# Patient Record
Sex: MALE | Race: OTHER
[De-identification: names, ages, dates, MRNs, and addresses within clinical notes are randomized per-mention and may not be internally consistent; named-entity substitution may affect disease eponyms.]

---

## 2018-12-03 ENCOUNTER — HOSPITAL ENCOUNTER (INPATIENT)
Dept: HOSPITAL 72 - EMR | Age: 68
LOS: 4 days | Discharge: NURSING, REHAB OR LONG-TERM CARE | DRG: 871 | End: 2018-12-07
Payer: MEDICARE

## 2018-12-03 VITALS — BODY MASS INDEX: 24.27 KG/M2 | HEIGHT: 66 IN | WEIGHT: 151 LBS

## 2018-12-03 VITALS — DIASTOLIC BLOOD PRESSURE: 71 MMHG | SYSTOLIC BLOOD PRESSURE: 122 MMHG

## 2018-12-03 VITALS — DIASTOLIC BLOOD PRESSURE: 83 MMHG | SYSTOLIC BLOOD PRESSURE: 121 MMHG

## 2018-12-03 VITALS — SYSTOLIC BLOOD PRESSURE: 139 MMHG | DIASTOLIC BLOOD PRESSURE: 72 MMHG

## 2018-12-03 VITALS — SYSTOLIC BLOOD PRESSURE: 136 MMHG | DIASTOLIC BLOOD PRESSURE: 85 MMHG

## 2018-12-03 DIAGNOSIS — G92: ICD-10-CM

## 2018-12-03 DIAGNOSIS — A41.9: Primary | ICD-10-CM

## 2018-12-03 DIAGNOSIS — D63.8: ICD-10-CM

## 2018-12-03 DIAGNOSIS — K44.9: ICD-10-CM

## 2018-12-03 DIAGNOSIS — I10: ICD-10-CM

## 2018-12-03 DIAGNOSIS — K27.9: ICD-10-CM

## 2018-12-03 DIAGNOSIS — Z86.73: ICD-10-CM

## 2018-12-03 DIAGNOSIS — K80.20: ICD-10-CM

## 2018-12-03 DIAGNOSIS — H26.9: ICD-10-CM

## 2018-12-03 DIAGNOSIS — G30.9: ICD-10-CM

## 2018-12-03 DIAGNOSIS — E87.1: ICD-10-CM

## 2018-12-03 DIAGNOSIS — N40.0: ICD-10-CM

## 2018-12-03 DIAGNOSIS — Z79.4: ICD-10-CM

## 2018-12-03 DIAGNOSIS — K21.9: ICD-10-CM

## 2018-12-03 DIAGNOSIS — F02.80: ICD-10-CM

## 2018-12-03 LAB
ADD MANUAL DIFF: NO
ALBUMIN SERPL-MCNC: 3.6 G/DL (ref 3.4–5)
ALBUMIN/GLOB SERPL: 0.8 {RATIO} (ref 1–2.7)
ALP SERPL-CCNC: 181 U/L (ref 46–116)
ALT SERPL-CCNC: 37 U/L (ref 12–78)
ANION GAP SERPL CALC-SCNC: 10 MMOL/L (ref 5–15)
APPEARANCE UR: CLEAR
APTT PPP: (no result) S
AST SERPL-CCNC: 18 U/L (ref 15–37)
BASOPHILS NFR BLD AUTO: 0.4 % (ref 0–2)
BILIRUB SERPL-MCNC: 0.4 MG/DL (ref 0.2–1)
BUN SERPL-MCNC: 9 MG/DL (ref 7–18)
CALCIUM SERPL-MCNC: 9.1 MG/DL (ref 8.5–10.1)
CHLORIDE SERPL-SCNC: 99 MMOL/L (ref 98–107)
CO2 SERPL-SCNC: 26 MMOL/L (ref 21–32)
CREAT SERPL-MCNC: 0.9 MG/DL (ref 0.55–1.3)
EOSINOPHIL NFR BLD AUTO: 0.3 % (ref 0–3)
ERYTHROCYTE [DISTWIDTH] IN BLOOD BY AUTOMATED COUNT: 12.2 % (ref 11.6–14.8)
GLOBULIN SER-MCNC: 4.8 G/DL
GLUCOSE UR STRIP-MCNC: (no result) MG/DL
HCT VFR BLD CALC: 41.8 % (ref 42–52)
HGB BLD-MCNC: 14 G/DL (ref 14.2–18)
KETONES UR QL STRIP: (no result)
LEUKOCYTE ESTERASE UR QL STRIP: NEGATIVE
LYMPHOCYTES NFR BLD AUTO: 11.4 % (ref 20–45)
MCV RBC AUTO: 74 FL (ref 80–99)
MONOCYTES NFR BLD AUTO: 4.3 % (ref 1–10)
NEUTROPHILS NFR BLD AUTO: 83.7 % (ref 45–75)
NITRITE UR QL STRIP: NEGATIVE
PH UR STRIP: 6.5 [PH] (ref 4.5–8)
PLATELET # BLD: 247 K/UL (ref 150–450)
POTASSIUM SERPL-SCNC: 4.1 MMOL/L (ref 3.5–5.1)
PROT UR QL STRIP: NEGATIVE
RBC # BLD AUTO: 5.61 M/UL (ref 4.7–6.1)
SODIUM SERPL-SCNC: 135 MMOL/L (ref 136–145)
SP GR UR STRIP: 1 (ref 1–1.03)
UROBILINOGEN UR-MCNC: NORMAL MG/DL (ref 0–1)
WBC # BLD AUTO: 12.6 K/UL (ref 4.8–10.8)

## 2018-12-03 PROCEDURE — 86140 C-REACTIVE PROTEIN: CPT

## 2018-12-03 PROCEDURE — 82009 KETONE BODYS QUAL: CPT

## 2018-12-03 PROCEDURE — 82105 ALPHA-FETOPROTEIN SERUM: CPT

## 2018-12-03 PROCEDURE — 36415 COLL VENOUS BLD VENIPUNCTURE: CPT

## 2018-12-03 PROCEDURE — 84100 ASSAY OF PHOSPHORUS: CPT

## 2018-12-03 PROCEDURE — 80048 BASIC METABOLIC PNL TOTAL CA: CPT

## 2018-12-03 PROCEDURE — 80053 COMPREHEN METABOLIC PANEL: CPT

## 2018-12-03 PROCEDURE — 84443 ASSAY THYROID STIM HORMONE: CPT

## 2018-12-03 PROCEDURE — 71045 X-RAY EXAM CHEST 1 VIEW: CPT

## 2018-12-03 PROCEDURE — 85025 COMPLETE CBC W/AUTO DIFF WBC: CPT

## 2018-12-03 PROCEDURE — 85060 BLOOD SMEAR INTERPRETATION: CPT

## 2018-12-03 PROCEDURE — 83690 ASSAY OF LIPASE: CPT

## 2018-12-03 PROCEDURE — 82962 GLUCOSE BLOOD TEST: CPT

## 2018-12-03 PROCEDURE — 83036 HEMOGLOBIN GLYCOSYLATED A1C: CPT

## 2018-12-03 PROCEDURE — 99284 EMERGENCY DEPT VISIT MOD MDM: CPT

## 2018-12-03 PROCEDURE — 96360 HYDRATION IV INFUSION INIT: CPT

## 2018-12-03 PROCEDURE — 87081 CULTURE SCREEN ONLY: CPT

## 2018-12-03 PROCEDURE — 74177 CT ABD & PELVIS W/CONTRAST: CPT

## 2018-12-03 PROCEDURE — 85007 BL SMEAR W/DIFF WBC COUNT: CPT

## 2018-12-03 PROCEDURE — 84550 ASSAY OF BLOOD/URIC ACID: CPT

## 2018-12-03 PROCEDURE — 74182 MRI ABDOMEN W/CONTRAST: CPT

## 2018-12-03 PROCEDURE — 82607 VITAMIN B-12: CPT

## 2018-12-03 PROCEDURE — 80076 HEPATIC FUNCTION PANEL: CPT

## 2018-12-03 PROCEDURE — 87040 BLOOD CULTURE FOR BACTERIA: CPT

## 2018-12-03 PROCEDURE — 83735 ASSAY OF MAGNESIUM: CPT

## 2018-12-03 PROCEDURE — 82248 BILIRUBIN DIRECT: CPT

## 2018-12-03 PROCEDURE — 81003 URINALYSIS AUTO W/O SCOPE: CPT

## 2018-12-03 PROCEDURE — 80061 LIPID PANEL: CPT

## 2018-12-03 RX ADMIN — INSULIN ASPART SCH UNITS: 100 INJECTION, SOLUTION INTRAVENOUS; SUBCUTANEOUS at 20:49

## 2018-12-03 RX ADMIN — DONEPEZIL HYDROCHLORIDE SCH MG: 10 TABLET, FILM COATED ORAL at 20:48

## 2018-12-03 RX ADMIN — DOCUSATE SODIUM SCH MG: 100 CAPSULE, LIQUID FILLED ORAL at 20:48

## 2018-12-03 NOTE — DIAGNOSTIC IMAGING REPORT
Clinical Indication: Abdominal pain, right lower quadrant

 

Technique:   No oral contrast utilized, per emergency room physician request  IV

administration nonionic contrast. Venous phase spiral acquisition obtained through

the abdomen and pelvis. Multiplanar reconstructions were generated. Total dose length

product 911.44 mGycm. CTDIvol(s) 16.14 mGy. Dose reduction achieved using automated

exposure control

 

 

Comparison: No comparison CTs. Reference made to abdomen MRI dated 9/12/2016 and

abdominal ultrasound dated 9/9/2016

 

Findings: Lack of enteric contrast limits assessment of the GI tract. The appendix is

normal. No evidence of diverticulosis or diverticulitis. There are bilateral inguinal

hernias that contain only fat. No small bowel distention. No free or loculated

intraperitoneal gas or fluid is evident. The distal esophagus, stomach, duodenum are

unremarkable.

 

The gallbladder contains gallstones. It is not distended and there is no gallbladder

wall thickening. The liver is mildly hypoattenuating. No focal abnormality

demonstrated. The extra hepatic bile ducts are mildly ectatic, measuring up to 7 mm

diameter. No downstream obstructive lesion demonstrated. The pancreas, spleen,

adrenals, right kidney are unremarkable. The with kidneys demonstrate subcentimeter

low-attenuation lesions which are too small to characterize. No renal or ureteral

calculi, hydronephrosis, or hydroureter demonstrated. The bladder is unremarkable.

The prostate is somewhat prominent, contains calcifications. No pelvic mass or

adenopathy. No retroperitoneal or mesenteric mass or adenopathy. There is a

retroaortic left renal vein incidentally noted.

 

The included lung bases are clear. The bones demonstrate degenerative spondylosis

changes.

 

Impression: Limited assessment of the GI tract, due to lack of enteric contrast

administration

 

No gross acute abnormality

 

Cholelithiasis, also previously described

 

Mildly fatty liver

 

Note that prior MRI described an area of signal abnormality, suspected mass, in

segment 4A of the liver. This is not evident on the current CT so significance of

this previous finding is uncertain

 

Mild prostatomegaly

 

Bilateral subcentimeter low-attenuation renal lesions, too small to characterize,

most likely benign simple cortical cysts

 

Incidental findings as noted, including degenerative spondylosis, retroaortic left

renal vein, bilateral fat-containing inguinal hernias, prostatic calcification

 

The CT scanner at [facility] is accredited by the [employer] College of

Radiology and the scans are performed using protocols designed to limit radiation

exposure to as low as reasonably achievable to attain images of sufficient resolution

adequate for diagnostic evaluation.

## 2018-12-03 NOTE — EMERGENCY ROOM REPORT
History of Present Illness


General


Chief Complaint:  Abnormal Labs


Source:  Patient


 (Tyler Dalton MD)





Present Illness


HPI


68-year-old male presents ED for evaluation.  Brought in by EMS for elevated 

blood sugar.  Coming from skilled nursing facility.  Accu-Chek greater than 

300.  Upon arrival patient showing no signs of distress.  Nonverbal at 

baseline.  Patient told nursing staff he's been having abdominal pain as well.  

Patient points to right lower quadrant.  Nonradiating.  Unable to provide any 

additional history at this time.  No nausea or vomiting.  No fevers or chills.  

No chest pain shortness of breath.  No other aggravating relieving factors.  

Denies any other associated symptoms


 (Tyler Dalton MD)


Allergies:  


Coded Allergies:  


     No Known Allergies (Unverified , 9/5/16)





Patient History


Past Medical History:  DM, HTN, CVA/TIA


Past Surgical History:  none


Pertinent Family History:  none


Social History:  Denies: smoking, alcohol use, drug use


Immunizations:  UTD


Reviewed Nursing Documentation:  PMH: Agreed; PSxH: Agreed (Tyler Dalton MD)





Nursing Documentation-PMH


Past Medical History:  No History, Except For


Hx Cardiac Problems:  Yes


Hx Hypertension:  Yes


Hx Diabetes:  Yes


Hx Cancer:  No


Hx Gastrointestinal Problems:  No


Hx Neurological Problems:  Yes


Hx Cerebrovascular Accident:  Yes


Hx Transient Ischemic Attacks:  No


Hx Dementia:  No


Hx Alzheimer's Disease:  No


Hx Parkinson's Disease:  No


Hx Meningitis:  No


Hx Encephalitis:  No


Hx Seizures:  No


Hx Epilepsy:  No


Hx Multiple Sclerosis:  No


Hx Cerebral Palsy:  No


Hx Amyotrophic Lat Sclerosis:  No


Hx Weakness:  Yes


Hx Neurologic Surgery:  No


Hx Brain Shunt:  No


 (Tyler Dalton MD)





Review of Systems


All Other Systems:  limited


 (Tyler Dalton MD)





Physical Exam





Vital Signs








  Date Time  Temp Pulse Resp B/P (MAP) Pulse Ox O2 Delivery O2 Flow Rate FiO2


 


12/3/18 13:06 98.4 112 20 138/85 94   








Sp02 EP Interpretation:  reviewed, normal


General Appearance:  no apparent distress, other - nonverbal


Head:  normocephalic, atraumatic


Eyes:  bilateral eye normal inspection, bilateral eye PERRL


ENT:  hearing grossly normal, normal pharynx, no angioedema, normal voice


Neck:  full range of motion, supple/symm/no masses


Respiratory:  chest non-tender, lungs clear, normal breath sounds, speaking 

full sentences


Cardiovascular #1:  regular rate, rhythm, no edema


Cardiovascular #2:  2+ carotid (R), 2+ carotid (L), 2+ radial (R), 2+ radial (L)

, 2+ dorsalis pedis (R), 2+ dorsalis pedis (L)


Gastrointestinal:  normal bowel sounds, soft, non-distended, no guarding, no 

rebound, tenderness - RLQ


Rectal:  deferred


Genitourinary:  normal inspection, no CVA tenderness


Musculoskeletal:  back normal, gait/station normal, normal range of motion, non-

tender


Neurologic:  other - nonverbal


Psychiatric:  other - nonverbal


Reflexes:  3+ bicep (R), 3+ bicep (L), 3+ tricep (R), 3+ tricep (L), 3+ knee (R)

, 3+ knee (L)


Skin:  normal color, no rash, warm/dry, well hydrated


Lymphatic:  no adenopathy


 (Tyler Dalton MD)





Medical Decision Making


Diagnostic Impression:  


 Primary Impression:  


 Diabetes mellitus out of control





Labs








Test


  12/3/18


13:31


 


White Blood Count


  12.6 K/UL


(4.8-10.8)


 


Red Blood Count


  5.61 M/UL


(4.70-6.10)


 


Hemoglobin


  14.0 G/DL


(14.2-18.0)


 


Hematocrit


  41.8 %


(42.0-52.0)


 


Mean Corpuscular Volume 74 FL (80-99) 


 


Mean Corpuscular Hemoglobin


  25.0 PG


(27.0-31.0)


 


Mean Corpuscular Hemoglobin


Concent 33.6 G/DL


(32.0-36.0)


 


Red Cell Distribution Width


  12.2 %


(11.6-14.8)


 


Platelet Count


  247 K/UL


(150-450)


 


Mean Platelet Volume


  6.7 FL


(6.5-10.1)


 


Neutrophils (%) (Auto)


  83.7 %


(45.0-75.0)


 


Lymphocytes (%) (Auto)


  11.4 %


(20.0-45.0)


 


Monocytes (%) (Auto)


  4.3 %


(1.0-10.0)


 


Eosinophils (%) (Auto)


  0.3 %


(0.0-3.0)


 


Basophils (%) (Auto)


  0.4 %


(0.0-2.0)


 


Sodium Level


  135 MMOL/L


(136-145)


 


Potassium Level


  4.1 MMOL/L


(3.5-5.1)


 


Chloride Level


  99 MMOL/L


()


 


Carbon Dioxide Level


  26 MMOL/L


(21-32)


 


Anion Gap


  10 mmol/L


(5-15)


 


Blood Urea Nitrogen 9 mg/dL (7-18) 


 


Creatinine


  0.9 MG/DL


(0.55-1.30)


 


Estimat Glomerular Filtration


Rate > 60 mL/min


(>60)


 


Glucose Level


  360 MG/DL


()


 


Calcium Level


  9.1 MG/DL


(8.5-10.1)


 


Magnesium Level


  1.3 MG/DL


(1.8-2.4)


 


Total Bilirubin


  0.4 MG/DL


(0.2-1.0)


 


Aspartate Amino Transf


(AST/SGOT) 18 U/L (15-37) 


 


 


Alanine Aminotransferase


(ALT/SGPT) 37 U/L (12-78) 


 


 


Alkaline Phosphatase


  181 U/L


()


 


Total Protein


  8.4 G/DL


(6.4-8.2)


 


Albumin


  3.6 G/DL


(3.4-5.0)


 


Globulin 4.8 g/dL 


 


Albumin/Globulin Ratio 0.8 (1.0-2.7) 


 


Lipase


  244 U/L


()


 


Acetone Level


  Negative


(NEGATIVE)








 (Tyler Dalton MD)





CT/MRI/US Diagnostic Results


CT/MRI/US Diagnostic Results :  


   Imaging Test Ordered:  CT abd/pelvis


   Impression


Limited assessment of the GI tract.  No gross acute abnormality.  

Cholelithiasis.  See official report for other incidental findings.


 ([facility]. )





Last Vital Signs








  Date Time  Temp Pulse Resp B/P (MAP) Pulse Ox O2 Delivery O2 Flow Rate FiO2


 


12/3/18 13:06 98.4 112 20 138/85 94   








 (Tyler Dalton MD)


Referrals:  


Sancho Ibarra MD (PCP)











Tyler Dalton MD Dec 3, 2018 14:55


[facility].  Dec 3, 2018 16:54

## 2018-12-04 VITALS — SYSTOLIC BLOOD PRESSURE: 123 MMHG | DIASTOLIC BLOOD PRESSURE: 87 MMHG

## 2018-12-04 VITALS — SYSTOLIC BLOOD PRESSURE: 161 MMHG | DIASTOLIC BLOOD PRESSURE: 95 MMHG

## 2018-12-04 VITALS — DIASTOLIC BLOOD PRESSURE: 93 MMHG | SYSTOLIC BLOOD PRESSURE: 126 MMHG

## 2018-12-04 VITALS — SYSTOLIC BLOOD PRESSURE: 94 MMHG | DIASTOLIC BLOOD PRESSURE: 72 MMHG

## 2018-12-04 VITALS — SYSTOLIC BLOOD PRESSURE: 153 MMHG | DIASTOLIC BLOOD PRESSURE: 99 MMHG

## 2018-12-04 VITALS — SYSTOLIC BLOOD PRESSURE: 153 MMHG | DIASTOLIC BLOOD PRESSURE: 91 MMHG

## 2018-12-04 LAB
ADD MANUAL DIFF: YES
ALBUMIN SERPL-MCNC: 3.4 G/DL (ref 3.4–5)
ALBUMIN/GLOB SERPL: 0.8 {RATIO} (ref 1–2.7)
ALP SERPL-CCNC: 136 U/L (ref 46–116)
ALT SERPL-CCNC: 31 U/L (ref 12–78)
ANION GAP SERPL CALC-SCNC: 10 MMOL/L (ref 5–15)
AST SERPL-CCNC: 17 U/L (ref 15–37)
BILIRUB DIRECT SERPL-MCNC: 0.2 MG/DL (ref 0–0.3)
BILIRUB SERPL-MCNC: 1.1 MG/DL (ref 0.2–1)
BUN SERPL-MCNC: 6 MG/DL (ref 7–18)
CALCIUM SERPL-MCNC: 8.5 MG/DL (ref 8.5–10.1)
CHLORIDE SERPL-SCNC: 98 MMOL/L (ref 98–107)
CHOLEST SERPL-MCNC: 80 MG/DL (ref ?–200)
CO2 SERPL-SCNC: 25 MMOL/L (ref 21–32)
CREAT SERPL-MCNC: 0.6 MG/DL (ref 0.55–1.3)
ERYTHROCYTE [DISTWIDTH] IN BLOOD BY AUTOMATED COUNT: 11.9 % (ref 11.6–14.8)
GLOBULIN SER-MCNC: 4.5 G/DL
HCT VFR BLD CALC: 38.8 % (ref 42–52)
HDLC SERPL-MCNC: 37 MG/DL (ref 40–60)
HGB BLD-MCNC: 13 G/DL (ref 14.2–18)
MCV RBC AUTO: 74 FL (ref 80–99)
PHOSPHATE SERPL-MCNC: 3.2 MG/DL (ref 2.5–4.9)
PLATELET # BLD: 240 K/UL (ref 150–450)
POTASSIUM SERPL-SCNC: 3.6 MMOL/L (ref 3.5–5.1)
RBC # BLD AUTO: 5.28 M/UL (ref 4.7–6.1)
SODIUM SERPL-SCNC: 133 MMOL/L (ref 136–145)
TRIGL SERPL-MCNC: 58 MG/DL (ref 30–150)
WBC # BLD AUTO: 16.2 K/UL (ref 4.8–10.8)

## 2018-12-04 RX ADMIN — SODIUM CHLORIDE SCH MLS/HR: 0.9 INJECTION INTRAVENOUS at 16:40

## 2018-12-04 RX ADMIN — INSULIN ASPART SCH UNITS: 100 INJECTION, SOLUTION INTRAVENOUS; SUBCUTANEOUS at 21:20

## 2018-12-04 RX ADMIN — Medication SCH MG: at 09:09

## 2018-12-04 RX ADMIN — DONEPEZIL HYDROCHLORIDE SCH MG: 10 TABLET, FILM COATED ORAL at 21:17

## 2018-12-04 RX ADMIN — INSULIN ASPART SCH UNITS: 100 INJECTION, SOLUTION INTRAVENOUS; SUBCUTANEOUS at 05:56

## 2018-12-04 RX ADMIN — INSULIN ASPART SCH UNITS: 100 INJECTION, SOLUTION INTRAVENOUS; SUBCUTANEOUS at 16:42

## 2018-12-04 RX ADMIN — HEPARIN SODIUM SCH UNITS: 5000 INJECTION INTRAVENOUS; SUBCUTANEOUS at 21:19

## 2018-12-04 RX ADMIN — METOPROLOL TARTRATE SCH MG: 25 TABLET, FILM COATED ORAL at 21:17

## 2018-12-04 RX ADMIN — Medication SCH EA: at 09:09

## 2018-12-04 RX ADMIN — INSULIN ASPART SCH UNITS: 100 INJECTION, SOLUTION INTRAVENOUS; SUBCUTANEOUS at 12:42

## 2018-12-04 RX ADMIN — HEPARIN SODIUM SCH UNITS: 5000 INJECTION INTRAVENOUS; SUBCUTANEOUS at 09:11

## 2018-12-04 RX ADMIN — ASPIRIN SCH MG: 81 TABLET, DELAYED RELEASE ORAL at 09:09

## 2018-12-04 RX ADMIN — TAMSULOSIN HYDROCHLORIDE SCH MG: 0.4 CAPSULE ORAL at 21:18

## 2018-12-04 RX ADMIN — INSULIN ASPART SCH UNITS: 100 INJECTION, SOLUTION INTRAVENOUS; SUBCUTANEOUS at 11:50

## 2018-12-04 RX ADMIN — INSULIN ASPART SCH UNITS: 100 INJECTION, SOLUTION INTRAVENOUS; SUBCUTANEOUS at 16:43

## 2018-12-04 RX ADMIN — DOCUSATE SODIUM SCH MG: 100 CAPSULE, LIQUID FILLED ORAL at 09:09

## 2018-12-04 NOTE — CONSULTATION
DATE OF CONSULTATION:  12/04/2018

INFECTIOUS DISEASE CONSULT



CONSULTING PHYSICIAN:  Florin Bowles M.D.



PRIMARY ATTENDING PHYSICIAN:  Sancho Ibarra M.D.



REASON FOR CONSULT:  Leukocytosis.



HISTORY OF PRESENT ILLNESS:  This 68-year-old  male admitted

yesterday from a skilled nursing facility because of uncontrolled diabetes

mellitus.  He had a blood sugar of 316 in hospital.  At presentation had 
leukocytosis of

12.6 that is increased to 16.2.  According to ER doctor, he complained of

abdominal pain and had a CT scan of abdomen and pelvis.  The patient has

dementia, and is a poor historian.



PAST MEDICAL HISTORY:  Significant for diabetes mellitus, hypertension, has

history of GI bleeding in 2016, has Alzheimer dementia, has history of

peptic ulcer, has history of BPH, and cataract.



ALLERGIES:  No known drug allergies.



MEDICATIONS:   metformin, Colace, lisinopril, atorvastatin, Flomax,

metoprolol, haloperidol, insulin, lisinopril, Lopressor, aspirin,

Cymbalta, multivitamin, Protonix, Senokot, heparin, insulin Detemir,

Aricept, milk of magnesia, and bisacodyl.



SOCIAL HISTORY:  Nursing home resident.  .  No other history

obtainable by the patient.



PHYSICAL EXAMINATION:

VITAL SIGNS:  Temperature 98.7, pulse is 78, and blood pressure is 152/91.

At the time of admission, pulse was 112.

GENERAL APPEARANCE:  No acute distress.

HEAD AND NECK:  Mucous membranes are moist.

HEART:  S1 and S2.  Regular.

LUNGS:  Clear.

ABDOMEN:  Soft, obese, and nontender.

EXTREMITIES:  Has no edema.



LABORATORY AND DIAGNOSTIC DATA:  WBC 16.2, hemoglobin 13, hematocrit 38.8,

and platelets 240,000.  Sodium 133, potassium 3.6, chloride 98, BUN 6,

creatinine 0.6, glucose 222.  LFTs within normal limits.  UA shows

negative for nitrites.  Blood was positive for ketones.  Blood acetone

level was negative.  CT scan of the abdomen and pelvis showed

cholelithiasis, calcified prostate, and mild fatty liver.



IMPRESSION:  Sepsis or systemic inflammatory response syndrome.  The

patient had tachycardia and leukocytosis.  Source of infection is not

clear, but may have early pneumonia.  The patient has diabetes mellitus

with hyperglycemia, has Alzheimer dementia, hypertension, benign prostatic

hypertrophy, and cataract.



RECOMMENDATION:

1. We will ask for chest x-ray.

2. We will start empirically on Rocephin and follow up the cultures.



At the end of my exam, I thank Dr. Ibarra, for involving me in the care

of this patient.









  ______________________________________________

  Florin Bowles M.D.





DR:  GERRI

D:  12/04/2018 12:21

T:  12/04/2018 19:23

JOB#:  [id number]

CC:



YANNA

## 2018-12-04 NOTE — DIAGNOSTIC IMAGING REPORT
Indication: Chest pain

 

Technique: One view of the chest

 

Comparison: 9/5/2016

 

Findings: Previously demonstrated right basilar atelectasis is no longer evident.

Inspiration is suboptimal. Lungs pleural spaces are clear. The heart size is upper

limits of normal. The aorta is tortuous and calcified.

 

Impression: No acute process

## 2018-12-04 NOTE — HISTORY AND PHYSICAL REPORT
DATE OF ADMISSION:  12/03/2018

HISTORY OF PRESENT ILLNESS:  The patient was admitted for hyperglycemia,

uncontrolled diabetes.  The patient is labile diabetic and admitted for

that reason.  The patient is a poor historian cannot get reliable history

from the patient.  The patient is also complaining of abdominal pain.  No

nausea, no vomiting, no diarrhea, no constipation.  She is admitted for

those reasons.



PAST MEDICAL HISTORY:  Organic brain syndrome, NIDDM, hypertension, history

of CVA, history of hypertension and diabetes, hyperlipidemia, constipation

as well as GERD, psychosis and BPH.



MEDICATIONS:  Aspirin, Lipitor, insulin, benazepril, Colace, Cymbalta,

ferrous sulfate, Levemir, multivitamin, metformin, Protonix, Seroquel, and

Flomax.



FAMILY HISTORY:  Unable to obtain.



SOCIAL HISTORY:  No history of alcohol or illicit drugs.  Comes from a

nursing home.



REVIEW OF SYSTEMS:  HEENT:  Denies headaches.    RESPIRATORY:  Denies

shortness of breath.  Denies cough.    CARDIOVASCULAR:  Denies chest pain.

GASTROINTESTINAL:  Denies nausea, vomiting, or diarrhea.  Does complain

of abdominal pain.  The patient is relatively a poor historian.



PHYSICAL EXAMINATION:

VITAL SIGNS:  Temperature 96.8, pulse is 111, blood pressure 122/71.

HEENT:  PERRLA.

NECK:  Supple.  No lymphadenopathy.

CHEST:  Clear to auscultation

GASTROINTESTINAL:  Diffuse tenderness, however, abdomen is soft and not in

any acute distress.  Reflexes equal on both sides.

SKIN:  For skin integrity, please refer to the nursing notes.

NEUROLOGIC:  The patient has generalized weakness, which is

chronic.



LABORATORY DATA:  WBC of 12.6, hemoglobin of 14, and platelets of 247.

Sodium 135, potassium 4.1, BUN of 9, and creatinine 0.9, and glucose of

360.



ASSESSMENT AND PLAN:  Hyperglycemia, uncontrolled diabetes, and abdominal

pain.



I have asked Dr. Nikolas Alvarado, Dr. Chu, Dr. Florin Bowles, Dr. Anne to see the patient to rule out any infectious etiology as well as

for workup abdominal pain and also for basically fluid management, the

patient will not be on dry side.









  ______________________________________________

  Sancho Ibarra M.D. DR:  Kyle

D:  12/04/2018 10:53

T:  12/04/2018 19:36

JOB#:  [id number]

CC:

## 2018-12-04 NOTE — CONSULTATION
History of Present Illness


General


Date patient seen:  Dec 4, 2018


Chief Complaint:  Abnormal Labs


Referring physician:  DIGNA BRISCOE


Reason for Consultation:  ABDOMINAL PAIN





Present Illness


HPI


68-year-old male with hx of CVA and anxiety, who presented to the emergency 

department for evaluation brought by the EMS


for elevated glucose, he is coming from the skilled nursing facility. the pt pw 

worsening of confusion and agitation


Allergies:  


Coded Allergies:  


     No Known Allergies (Unverified , 9/5/16)





Medication History


Scheduled


Ascorbic Acid* (Vitamin C*), 500 MG ORAL DAILY, (Reported)


Aspirin (Aspirin EC), 81 MG ORAL DAILY, (Reported)


Atorvastatin Calcium* (Atorvastatin Calcium*), 80 MG ORAL DAILY, (Reported)


Docusate Sodium* (Docusate Sodium*), 100 MG ORAL Q12HR, (Reported)


Donepezil Hcl* (Donepezil Hcl*), 10 MG ORAL QHS, (Reported)


Duloxetine (Cymbalta), 80 MG ORAL DAILY, (Reported)


Ferrous Sulfate* (Ferrous Sulfate*), 325 MG ORAL DAILY, (Reported)


Insulin Detemir (Levemir), 14 UNITS SUBQ DAILY, (Reported)


Insulin Detemir (Levemir), 30 UNITS SUBQ BEDTIME, (Reported)


Metformin Hcl* (Metformin Hcl*), 850 MG ORAL BID, (Reported)


Multivitamin With Minerals (Multivitamins With Minerals*), 1 TAB ORAL DAILY, (

Reported)


Nifedipine (Nifedipine*), 60 MG ORAL EVERY 8 HOURS, (Reported)


Pantoprazole* (Protonix*), 40 MG ORAL DAILY, (Reported)


Quinapril Hcl (Quinapril Hcl), 20 MG PO BID, (Reported)


Tamsulosin Hcl (Tamsulosin Hcl*), 0.4 MG ORAL DAILY, (Reported)


Vitamin D (Vitamin D3), 1,000 UNITS ORAL DAILY, (Reported)





Scheduled PRN


Bisacodyl* (Dulcolax*), 5 MG ORAL DAILY PRN for Constipation, (Reported)





Patient History


History Provided By:  Patient, Medical Record, PMD


Healthcare decision maker





Resuscitation status





Advanced Directive on File








Past Medical/Surgical History


Past Medical/Surgical History:  


(1) Anemia due to acute blood loss


(2) Colonization with VRE (vancomycin-resistant enterococcus)


(3) Coffee ground emesis


(4) MRSA colonization


(5) Cholangitis due to bile duct calculus with obstruction


(6) Diabetes mellitus out of control


(7) hyper glycemia


(8) Peptic ulcer disease


(9) Cholelithiasis


(10) Liver lesion


(11) Leukocytosis


(12) Sepsis


(13) encephalopathy due to toxin





Review of Systems


Psychiatric:  Reports: prior hx, anxiety, depressed feelings, hallucinations





Physical Exam


General Appearance:  alert, confused





Last 24 Hour Vital Signs








  Date Time  Temp Pulse Resp B/P (MAP) Pulse Ox O2 Delivery O2 Flow Rate FiO2


 


12/4/18 21:17  112  123/87    


 


12/4/18 20:12      Room Air  


 


12/4/18 20:00 97.6 112 18 123/87 (99) 99   


 


12/4/18 12:50  115  126/93    


 


12/4/18 12:00 98.2 115 18 126/93 (104) 98   


 


12/4/18 11:45    104/74    


 


12/4/18 09:00      Room Air  


 


12/4/18 08:45  78  153/91 (111)    


 


12/4/18 08:00 98.5 95 19 161/95 (117) 94   


 


12/4/18 04:00 96.8 96 17 153/99 (117) 95   


 


12/4/18 00:00 97.0 107 16 94/72 (79) 98   

















Intake and Output  


 


 12/3/18 12/4/18





 19:00 07:00


 


  


 


# Voids 1 2











Laboratory Tests








Test


  12/4/18


06:10


 


White Blood Count


  16.2 K/UL


(4.8-10.8)  H


 


Red Blood Count


  5.28 M/UL


(4.70-6.10)


 


Hemoglobin


  13.0 G/DL


(14.2-18.0)  L


 


Hematocrit


  38.8 %


(42.0-52.0)  L


 


Mean Corpuscular Volume


  74 FL (80-99)


L


 


Mean Corpuscular Hemoglobin


  24.6 PG


(27.0-31.0)  L


 


Mean Corpuscular Hemoglobin


Concent 33.4 G/DL


(32.0-36.0)


 


Red Cell Distribution Width


  11.9 %


(11.6-14.8)


 


Platelet Count


  240 K/UL


(150-450)


 


Mean Platelet Volume


  7.2 FL


(6.5-10.1)


 


Neutrophils (%) (Auto)


  % (45.0-75.0)


 


 


Lymphocytes (%) (Auto)


  % (20.0-45.0)


 


 


Monocytes (%) (Auto)  % (1.0-10.0)  


 


Eosinophils (%) (Auto)  % (0.0-3.0)  


 


Basophils (%) (Auto)  % (0.0-2.0)  


 


Differential Total Cells


Counted 100  


 


 


Neutrophils % (Manual) 82 % (45-75)  H


 


Lymphocytes % (Manual) 9 % (20-45)  L


 


Monocytes % (Manual) 8 % (1-10)  


 


Eosinophils % (Manual) 0 % (0-3)  


 


Basophils % (Manual) 0 % (0-2)  


 


Band Neutrophils 1 % (0-8)  


 


Platelet Estimate Adequate  


 


Platelet Morphology Normal  


 


Hypochromasia 1+  


 


Microcytosis 1+  


 


Sodium Level


  133 MMOL/L


(136-145)  L


 


Potassium Level


  3.6 MMOL/L


(3.5-5.1)


 


Chloride Level


  98 MMOL/L


()


 


Carbon Dioxide Level


  25 MMOL/L


(21-32)


 


Anion Gap


  10 mmol/L


(5-15)


 


Blood Urea Nitrogen


  6 mg/dL (7-18)


L


 


Creatinine


  0.6 MG/DL


(0.55-1.30)


 


Estimat Glomerular Filtration


Rate > 60 mL/min


(>60)


 


Glucose Level


  222 MG/DL


()  #H


 


Hemoglobin A1c


  9.7 %


(4.3-6.0)  H


 


Uric Acid


  2.2 MG/DL


(2.6-7.2)  L


 


Calcium Level


  8.5 MG/DL


(8.5-10.1)


 


Phosphorus Level


  3.2 MG/DL


(2.5-4.9)


 


Magnesium Level


  1.4 MG/DL


(1.8-2.4)  L


 


Total Bilirubin


  1.1 MG/DL


(0.2-1.0)  H


 


Direct Bilirubin


  0.2 MG/DL


(0.0-0.3)


 


Aspartate Amino Transf


(AST/SGOT) 17 U/L (15-37)


 


 


Alanine Aminotransferase


(ALT/SGPT) 31 U/L (12-78)


 


 


Alkaline Phosphatase


  136 U/L


()  H


 


C-Reactive Protein,


Quantitative 5.0 mg/dL


(0.00-0.90)  H


 


Total Protein


  7.9 G/DL


(6.4-8.2)


 


Albumin


  3.4 G/DL


(3.4-5.0)


 


Globulin 4.5 g/dL  


 


Albumin/Globulin Ratio


  0.8 (1.0-2.7)


L


 


Triglycerides Level


  58 MG/DL


()


 


Cholesterol Level


  80 MG/DL (<


200)


 


LDL Cholesterol


  44 mg/dL


(<100)


 


HDL Cholesterol


  37 MG/DL


(40-60)  L


 


Cholesterol/HDL Ratio


  2.2 (3.3-4.4)


L


 


Vitamin B12 Level


  301 PG/ML


(193-986)


 


Thyroid Stimulating Hormone


(TSH) 1.683 uiU/mL


(0.358-3.740)








Height (Feet):  5


Height (Inches):  6.00


Weight (Pounds):  150


Medications





Current Medications








 Medications


  (Trade)  Dose


 Ordered  Sig/Eri


 Route


 PRN Reason  Start Time


 Stop Time Status Last Admin


Dose Admin


 


 Aspirin


  (Ecotrin)  81 mg  DAILY


 ORAL


   12/4/18 09:00


 1/3/19 08:59  12/4/18 09:09


 


 


 Atorvastatin


 Calcium


  (Lipitor)  20 mg  QHS


 ORAL


   12/4/18 21:00


 1/2/19 20:59  12/4/18 21:18


 


 


 Bisacodyl


  (Dulcolax)  5 mg  DAILYPRN  PRN


 ORAL


 Constipation  12/3/18 19:00


 1/2/19 18:59   


 


 


 Ceftriaxone


 Sodium 1 gm/


 Dextrose  55 ml @ 


 110 mls/hr  Q24H


 IVPB


   12/4/18 13:00


 12/11/18 12:59  12/4/18 16:40


 


 


 Dextrose


  (Dextrose 50%)  25 ml  Q30M  PRN


 IV


 Hypoglycemia  12/4/18 07:45


 1/3/19 07:44   


 


 


 Dextrose


  (Dextrose 50%)  50 ml  Q30M  PRN


 IV


 Hypoglycemia  12/4/18 07:45


 1/3/19 07:44   


 


 


 Docusate Sodium


  (Colace)  100 mg  TIDPRN


 ORAL


   12/5/18 09:00


 1/2/19 20:59   


 


 


 Donepezil HCl


  (Aricept)  10 mg  QHS


 ORAL


   12/3/18 21:00


 1/2/19 20:59  12/4/18 21:17


 


 


 Duloxetine HCl


  (Cymbalta)  80 mg  DAILY


 ORAL


   12/4/18 09:00


 1/3/19 08:59  12/4/18 09:08


 


 


 Gadobutrol


  (Gadavist)  7.5 mmol  NOW  PRN


 IV


 Radiology Procedure  12/4/18 14:00


 12/8/18 13:51   


 


 


 Heparin Sodium


  (Porcine)


  (Heparin 5000


 units/ml)  5,000 units  EVERY 12  HOURS


 SUBQ


   12/4/18 09:00


 1/3/19 08:59  12/4/18 21:19


 


 


 Insulin Aspart


  (NovoLOG)    BEFORE MEALS AND  HS


 SUBQ


   12/3/18 21:00


 1/2/19 20:59  12/4/18 21:20


 


 


 Insulin Aspart


  (NovoLOG)  5 units  NOVOTIAC


 SUBQ


   12/4/18 11:50


 1/3/19 11:49   


 


 


 Insulin Detemir


  (Levemir)  15 units  DAILY


 SUBQ


   12/4/18 09:00


 1/3/19 08:59  12/4/18 09:10


 


 


 Iopamidol


  (Isovue-300


 100ml)  100 ml  NOW  PRN


 INJ


 Radiology Procedure  12/3/18 13:30


 12/5/18 13:29   


 


 


 Lisinopril


  (Zestril)  10 mg  DAILY


 ORAL


   12/5/18 09:00


 1/4/19 08:59   


 


 


 Magnesium


 Hydroxide


  (Mom)  30 ml  Q4H  PRN


 ORAL


 Constipation  12/3/18 20:30


 1/2/19 18:59   


 


 


 Metformin HCl


  (Glucophage)  500 mg  TIAC


 ORAL


   12/5/18 11:30


 1/4/19 11:29   


 


 


 Metoprolol


 Tartrate


  (Lopressor)  12.5 mg  Q12HR


 ORAL


   12/4/18 21:00


 1/3/19 20:59  12/4/18 21:17


 


 


 Multivitamins


 Therapeutic


  (Therapeutic


 Multivitamin)  1 ea  DAILY


 ORAL


   12/4/18 09:00


 1/3/19 08:59  12/4/18 09:09


 


 


 Pantoprazole


  (Protonix)  40 mg  DAILY


 ORAL


   12/4/18 09:00


 1/3/19 08:59  12/4/18 09:09


 


 


 Sennosides


  (Senokot)  8.6 mg  DAILY


 ORAL


   12/4/18 09:00


 1/2/19 18:59  12/4/18 09:09


 


 


 Tamsulosin HCl


  (Flomax)  0.4 mg  QHS


 ORAL


   12/4/18 21:00


 1/3/19 08:59  12/4/18 21:18


 











Assessment/Plan


Problem List:  


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


Status:  stable


Assessment/Plan


low dose zyprexa 2.5 mg qhs


ativan prn


the pt lacks capacity to make decisions.











Humberto Coronel MD Dec 4, 2018 23:48

## 2018-12-04 NOTE — GI INITIAL CONSULT NOTE
History of Present Illness


General


Date patient seen:  Dec 4, 2018


Time patient seen:  16:40


Reason for Hospitalization:  Abnormal Labs


Referring physician:  DIGNA BRISCOE


Reason for Consultation:  ABDOMINAL PAIN





Present Illness


HPI


68-year-old male presents ED for evaluation.  Brought in by EMS for elevated 

blood sugar.  Coming from skilled nursing facility.  Accu-Chek greater than 

300.  Upon arrival patient showing no signs of distress.  Nonverbal at 

baseline.  Patient told nursing staff he's been having abdominal pain as well.  

Patient points to right lower quadrant.  Nonradiating.  Unable to provide any 

additional history at this time.  No nausea or vomiting.  No fevers or chills.  

No chest pain shortness of breath.  No other aggravating relieving factors.  

Denies any other associated symptoms


GI consulted for abdominal pain, noted liver mass on previous MRI.  ROS limited

, patient is lethargic unable to provide any history at this time.  He presents 

with abdominal pain, possible liver mass as seen on past MRI.  Labs reviewed 

show mild anemia, electrolyte imbalance and elevated glucose levels.  Unknown 

history of endoscopy / colonoscopy at this time.


Home Meds


Reported Medications


Vitamin D (Vitamin D3) 400 Unit Tablet, 1000 UNITS ORAL DAILY, TAB


   3/26/18


Ascorbic Acid* (VITAMIN C*) 500 Mg Tablet, 500 MG ORAL DAILY, #30 TAB 0 Refills


   3/26/18


Quinapril Hcl (QUINAPRIL HCL) 20 Mg Tablet, 20 MG PO BID, TAB


   3/26/18


Pantoprazole* (PROTONIX*) 40 Mg Tablet.dr, 40 MG ORAL DAILY, TAB


   3/26/18


Nifedipine (Nifedipine*) 20 Mg Capsule, 60 MG ORAL EVERY 8 HOURS, CAP


   3/26/18


Multivitamin With Minerals (MULTIVITAMINS WITH MINERALS*) 1 Each Tablet, 1 TAB 

ORAL DAILY, TAB


   3/26/18


Metformin Hcl* (METFORMIN HCL*) 850 Mg Tablet, 850 MG ORAL BID, TAB


   3/26/18


Insulin Detemir (LEVEMIR) 100 Unit/1 Ml Vial, 30 UNITS SUBQ BEDTIME, VIAL


   3/26/18


Insulin Detemir (LEVEMIR) 100 Unit/1 Ml Vial, 14 UNITS SUBQ DAILY, VIAL


   3/26/18


Tamsulosin Hcl (TAMSULOSIN HCL*) 0.4 Mg Cap.er.24h, 0.4 MG ORAL DAILY, CAP


   3/26/18


Ferrous Sulfate* (FERROUS SULFATE*) 325 Mg Tablet, 325 MG ORAL DAILY, #30 TAB 0 

Refills


   3/26/18


Donepezil Hcl* (DONEPEZIL HCL*) 10 Mg Tablet, 10 MG ORAL QHS, TAB


   3/26/18


Docusate Sodium* (DOCUSATE SODIUM*) 100 Mg Capsule, 100 MG ORAL Q12HR, CAP


   3/26/18


Duloxetine (Cymbalta) 20 Mg Capsule.dr, 80 MG ORAL DAILY, CAP


   3/26/18


Bisacodyl* (DULCOLAX*) 5 Mg Tablet.dr, 5 MG ORAL DAILY PRN for Constipation, #

10 TAB 0 Refills


   3/26/18


Atorvastatin Calcium* (ATORVASTATIN CALCIUM*) 40 Mg Tablet, 80 MG ORAL DAILY, 

TAB


   3/26/18


Aspirin (Aspirin EC) 81 Mg Tablet.dr, 81 MG ORAL DAILY, TAB


   3/26/18


Med list reviewed/reconciled:  Yes


Allergies:  


Coded Allergies:  


     No Known Allergies (Unverified , 9/5/16)





Patient History


Limited by:  medical condition


History Provided By:  Patient, Medical Record


PMH Narrative


Past Medical History:  DM, HTN, CVA/TIA


Past Surgical History:  none


Pertinent Family History:  none


Social History:  Denies: smoking, alcohol use, drug use


Immunizations:  UTD


Reviewed Nursing Documentation:  PMH: Agreed; PSxH: Agreed 





Nursing Documentation-PMH


Past Medical History:  No History, Except For


Hx Cardiac Problems:  Yes


Hx Hypertension:  Yes


Hx Diabetes:  Yes


Hx Cancer:  No


Hx Gastrointestinal Problems:  No


Hx Neurological Problems:  Yes


Hx Cerebrovascular Accident:  Yes


Hx Transient Ischemic Attacks:  No


Hx Dementia:  No


Hx Alzheimer's Disease:  No


Hx Parkinson's Disease:  No


Hx Meningitis:  No


Hx Encephalitis:  No


Hx Seizures:  No


Hx Epilepsy:  No


Hx Multiple Sclerosis:  No


Hx Cerebral Palsy:  No


Hx Amyotrophic Lat Sclerosis:  No


Hx Weakness:  Yes


Hx Neurologic Surgery:  No


Hx Brain Shunt:  No





Review of Systems


All Other Systems:  limited





Physical Exam





Vital Signs








  Date Time  Temp Pulse Resp B/P (MAP) Pulse Ox O2 Delivery O2 Flow Rate FiO2


 


12/3/18 13:06 98.4 112 20 138/85 94   


 


12/3/18 15:00      Room Air  








Sp02 EP Interpretation:  reviewed, normal


Labs





Laboratory Tests








Test


  12/4/18


06:10


 


White Blood Count


  16.2 K/UL


(4.8-10.8)  H


 


Red Blood Count


  5.28 M/UL


(4.70-6.10)


 


Hemoglobin


  13.0 G/DL


(14.2-18.0)  L


 


Hematocrit


  38.8 %


(42.0-52.0)  L


 


Mean Corpuscular Volume


  74 FL (80-99)


L


 


Mean Corpuscular Hemoglobin


  24.6 PG


(27.0-31.0)  L


 


Mean Corpuscular Hemoglobin


Concent 33.4 G/DL


(32.0-36.0)


 


Red Cell Distribution Width


  11.9 %


(11.6-14.8)


 


Platelet Count


  240 K/UL


(150-450)


 


Mean Platelet Volume


  7.2 FL


(6.5-10.1)


 


Neutrophils (%) (Auto)


  % (45.0-75.0)


 


 


Lymphocytes (%) (Auto)


  % (20.0-45.0)


 


 


Monocytes (%) (Auto)  % (1.0-10.0)  


 


Eosinophils (%) (Auto)  % (0.0-3.0)  


 


Basophils (%) (Auto)  % (0.0-2.0)  


 


Differential Total Cells


Counted 100  


 


 


Neutrophils % (Manual) 82 % (45-75)  H


 


Lymphocytes % (Manual) 9 % (20-45)  L


 


Monocytes % (Manual) 8 % (1-10)  


 


Eosinophils % (Manual) 0 % (0-3)  


 


Basophils % (Manual) 0 % (0-2)  


 


Band Neutrophils 1 % (0-8)  


 


Platelet Estimate Adequate  


 


Platelet Morphology Normal  


 


Hypochromasia 1+  


 


Microcytosis 1+  


 


Sodium Level


  133 MMOL/L


(136-145)  L


 


Potassium Level


  3.6 MMOL/L


(3.5-5.1)


 


Chloride Level


  98 MMOL/L


()


 


Carbon Dioxide Level


  25 MMOL/L


(21-32)


 


Anion Gap


  10 mmol/L


(5-15)


 


Blood Urea Nitrogen


  6 mg/dL (7-18)


L


 


Creatinine


  0.6 MG/DL


(0.55-1.30)


 


Estimat Glomerular Filtration


Rate > 60 mL/min


(>60)


 


Glucose Level


  222 MG/DL


()  #H


 


Hemoglobin A1c


  9.7 %


(4.3-6.0)  H


 


Uric Acid


  2.2 MG/DL


(2.6-7.2)  L


 


Calcium Level


  8.5 MG/DL


(8.5-10.1)


 


Phosphorus Level


  3.2 MG/DL


(2.5-4.9)


 


Magnesium Level


  1.4 MG/DL


(1.8-2.4)  L


 


Total Bilirubin


  1.1 MG/DL


(0.2-1.0)  H


 


Direct Bilirubin


  0.2 MG/DL


(0.0-0.3)


 


Aspartate Amino Transf


(AST/SGOT) 17 U/L (15-37)


 


 


Alanine Aminotransferase


(ALT/SGPT) 31 U/L (12-78)


 


 


Alkaline Phosphatase


  136 U/L


()  H


 


C-Reactive Protein,


Quantitative 5.0 mg/dL


(0.00-0.90)  H


 


Total Protein


  7.9 G/DL


(6.4-8.2)


 


Albumin


  3.4 G/DL


(3.4-5.0)


 


Globulin 4.5 g/dL  


 


Albumin/Globulin Ratio


  0.8 (1.0-2.7)


L


 


Triglycerides Level


  58 MG/DL


()


 


Cholesterol Level


  80 MG/DL (<


200)


 


LDL Cholesterol


  44 mg/dL


(<100)


 


HDL Cholesterol


  37 MG/DL


(40-60)  L


 


Cholesterol/HDL Ratio


  2.2 (3.3-4.4)


L


 


Vitamin B12 Level


  301 PG/ML


(193-986)


 


Thyroid Stimulating Hormone


(TSH) 1.683 uiU/mL


(0.358-3.740)








General Appearance:  well appearing, no apparent distress, lethargic


Head:  normocephalic


EENT:  PERRL/EOMI, normal ENT inspection


Neck:  supple


Respiratory:  normal breath sounds, no respiratory distress


Cardiovascular:  normal rate


Gastrointestinal:  normal inspection, non tender, soft, normal bowel sounds, non

-distended


Rectal:  deferred


Genitourinary:  deferred


Musculoskeletal:  normal inspection, back normal


Neurologic:  alert, responsive


Psychiatric:  normal inspection, judgement/insight normal, memory normal


Skin:  normal inspection, normal color, no rash, warm/dry, palpation normal, 

well hydrated


Lymphatic:  normal inspection, no adenopathy


Current Medications





Current Medications








 Medications


  (Trade)  Dose


 Ordered  Sig/Eri


 Route


 PRN Reason  Start Time


 Stop Time Status Last Admin


Dose Admin


 


 Aspirin


  (Ecotrin)  81 mg  DAILY


 ORAL


   12/4/18 09:00


 1/3/19 08:59  12/4/18 09:09


 


 


 Atorvastatin


 Calcium


  (Lipitor)  20 mg  QHS


 ORAL


   12/4/18 21:00


 1/2/19 20:59   


 


 


 Bisacodyl


  (Dulcolax)  5 mg  DAILYPRN  PRN


 ORAL


 Constipation  12/3/18 19:00


 1/2/19 18:59   


 


 


 Ceftriaxone


 Sodium 1 gm/


 Dextrose  55 ml @ 


 110 mls/hr  Q24H


 IVPB


   12/4/18 13:00


 12/11/18 12:59   


 


 


 Dextrose


  (Dextrose 50%)  25 ml  Q30M  PRN


 IV


 Hypoglycemia  12/4/18 07:45


 1/3/19 07:44   


 


 


 Dextrose


  (Dextrose 50%)  50 ml  Q30M  PRN


 IV


 Hypoglycemia  12/4/18 07:45


 1/3/19 07:44   


 


 


 Docusate Sodium


  (Colace)  100 mg  TIDPRN


 ORAL


   12/5/18 09:00


 1/2/19 20:59   


 


 


 Donepezil HCl


  (Aricept)  10 mg  QHS


 ORAL


   12/3/18 21:00


 1/2/19 20:59  12/3/18 20:48


 


 


 Duloxetine HCl


  (Cymbalta)  80 mg  DAILY


 ORAL


   12/4/18 09:00


 1/3/19 08:59  12/4/18 09:08


 


 


 Gadobutrol


  (Gadavist)  7.5 mmol  NOW  PRN


 IV


 Radiology Procedure  12/4/18 14:00


 12/8/18 13:51   


 


 


 Heparin Sodium


  (Porcine)


  (Heparin 5000


 units/ml)  5,000 units  EVERY 12  HOURS


 SUBQ


   12/4/18 09:00


 1/3/19 08:59  12/4/18 09:11


 


 


 Insulin Aspart


  (NovoLOG)    BEFORE MEALS AND  HS


 SUBQ


   12/3/18 21:00


 1/2/19 20:59  12/4/18 12:42


 


 


 Insulin Aspart


  (NovoLOG)  5 units  NOVOTIAC


 SUBQ


   12/4/18 11:50


 1/3/19 11:49   


 


 


 Insulin Detemir


  (Levemir)  15 units  DAILY


 SUBQ


   12/4/18 09:00


 1/3/19 08:59  12/4/18 09:10


 


 


 Iopamidol


  (Isovue-300


 100ml)  100 ml  NOW  PRN


 INJ


 Radiology Procedure  12/3/18 13:30


 12/5/18 13:29   


 


 


 Lisinopril


  (Zestril)  10 mg  DAILY


 ORAL


   12/5/18 09:00


 1/4/19 08:59   


 


 


 Magnesium


 Hydroxide


  (Mom)  30 ml  Q4H  PRN


 ORAL


 Constipation  12/3/18 20:30


 1/2/19 18:59   


 


 


 Metformin HCl


  (Glucophage)  500 mg  TIAC


 ORAL


   12/5/18 11:30


 1/4/19 11:29   


 


 


 Metoprolol


 Tartrate


  (Lopressor)  12.5 mg  Q12HR


 ORAL


   12/4/18 21:00


 1/3/19 20:59   


 


 


 Multivitamins


 Therapeutic


  (Therapeutic


 Multivitamin)  1 ea  DAILY


 ORAL


   12/4/18 09:00


 1/3/19 08:59  12/4/18 09:09


 


 


 Pantoprazole


  (Protonix)  40 mg  DAILY


 ORAL


   12/4/18 09:00


 1/3/19 08:59  12/4/18 09:09


 


 


 Sennosides


  (Senokot)  8.6 mg  DAILY


 ORAL


   12/4/18 09:00


 1/2/19 18:59  12/4/18 09:09


 


 


 Tamsulosin HCl


  (Flomax)  0.4 mg  QHS


 ORAL


   12/4/18 21:00


 1/3/19 08:59   


 











GI: Plan


Problems:  


(1) hyper glycemia


(2) Peptic ulcer disease


Plan


recent EGD/colonoscopy this year March 2018.


1. A 3 to 4 cm hiatal hernia.


2. Lower esophageal reflux erosions consistent with gastroesophageal reflux.


3. Normal colonoscopy including 10 cm of terminal ileum, although visualization 

was somewhat suboptimal.


history of iron deficiency





anemia work up


DM management


advance diet to ADA diet


MRI ordered to evaluate liver mass.


ppi


zofran prn


fu labs, AFP





Discussed with Dr. Anne.


Thank you for this patient referral, we will follow.





The patient was seen and examined at bedside and all new and available data was 

reviewed in the patients chart. I agree with the above findings, impression 

and plan.  (Patient seen earlier today. Signature stamp does not reflect 

patient encounter time.). - MD Susie Mckay,Nela-Vito NP Dec 4, 2018 15:55

## 2018-12-04 NOTE — CONSULTATION
Consult Note


Consult Note





68-year-old male presents ED for evaluation.  Brought in by EMS for elevated 

blood sugar.  Coming from skilled nursing facility.  Accu-Chek greater than 

300.  Upon arrival patient showing no signs of distress.  Nonverbal at 

baseline.  Patient told nursing staff he's been having abdominal pain as well.  

Patient points to right lower quadrant.  Nonradiating.  Unable to provide any 

additional history at this time.  No nausea or vomiting.  No fevers or chills.  

No chest pain shortness of breath.  No other aggravating relieving factors.  

Denies any other associated symptoms





     No Known Allergies (Unverified , 9/5/16)








Past Medical History:  DM, HTN, CVA/TIA





Past Medical History:  No History, Except For


Hx Cardiac Problems:  Yes


Hx Hypertension:  Yes


Hx Diabetes:  Yes








Hx Neurological Problems:  Yes


Hx Cerebrovascular Accident:  Yes


Hx Weakness:  Yes





[id number]


data reviewed


Assessment/Plan





DM OOC


HTN


CVA





BS control


BP control


Monitor renal parameters











Kapil Chu MD Dec 4, 2018 11:33

## 2018-12-04 NOTE — DIAGNOSTIC IMAGING REPORT
Indication: Cholelithiasis, abdominal pain, possible mass on prior liver MRI

 

Technique: Axial and coronal single shot fast spin echo,, axial T2 FRFSE

fat-saturated, axial 3-D dual echo, axial 2-D FIESTA, 2-D thick slab MRCP, coronal

3-D MRCP, pre and postcontrast axial LAVA Flex, postcontrast coronal LAVA flex images

of the abdomen

 

Comparison: 12/3/2018 CT scan, 9/12/2016 the gallbladder demonstrates gallstones. MRI

 

Findings: Exam is limited due to significant motion artifact. Tiny T2 hyperintense

lesion is seen in posterior segment 2 of the liver, seen only on the axial T2

fat-saturated images, and not on any the other sequences. The larger lesion described

on the prior MRI is not evident currently. No other liver lesions are evident

 

Again demonstrated are gallstones. No biliary ductal dilatation or intraluminal

biliary ductal filling defects. The pancreatic duct is normal in caliber and

appearance. The pancreas, spleen, adrenals, kidneys are unremarkable. There is

suggestion of some free fluid in the right paracolic gutter a T2 fat-saturated images

only; suspect that this is artifactual as no fluid is seen in this area on other

sequences or on prior CT.

 

Impression: Limited exam, as described

 

Previously reported (in 2016) left lobe liver lesion is no longer evident, may have

been a cyst that involuted, versus artifact.

 

Cholelithiasis. No evidence of biliary ductal dilatation or choledocholithiasis

 

Possible tiny left lobe liver cyst, seen only a single sequence

## 2018-12-04 NOTE — CONSULTATION
DATE OF CONSULTATION:  12/04/2018

ENDOCRINOLOGY CONSULTATION



CONSULTING PHYSICIAN:  Nikolas Alvarado M.D.



REFERRING PHYSICIAN:  Sancho Ibarra M.D.



REASON FOR CONSULTATION:  Diabetes management.



HISTORY OF PRESENT ILLNESS:  The patient is a 68-year-old male, who

presented to the emergency department for evaluation brought by the EMS

for elevated glucose, he is coming from the skilled nursing facility.

Accu-Chek was greater than 300. I was called to manage diabetes.



PAST MEDICAL HISTORY:

1. Diabetes.

2. Hypertension.

3. CVA.

4. TIA.



PAST SURGICAL HISTORY:  None.



FAMILY HISTORY:  Noncontributory.



SOCIAL HISTORY:  He lives in a skilled nursing facility.



REVIEW OF SYSTEMS:  Difficult to obtain.



LABORATORY DATA:  Sodium 133, potassium 3.6, chloride 98, bicarbonate 25,

BUN 10, creatinine 0.6, glucose 320



PHYSICAL EXAMINATION:

GENERAL:  Not in apparent distress.

VITAL SIGNS:  Blood pressure 97/72, pulse of 107, temperature 97.

HEENT:  No scleral icterus.

NECK:  No JVD.

HEART:  Regular.

LUNGS:  Clear.

ABDOMEN:  Positive bowel sounds.

EXTREMITIES:  No clubbing, cyanosis, or edema.



DIAGNOSIS:  Diabetes, out of control.



PLAN:

1. Start Levemir 15 units daily.

2. Start NovoLog 5 units before each meal.

3. Start metformin 500 mg t.i.d.

4. NovoLog sliding scale insulin.

5. Further adjustment according to blood glucose values.



Thank you, Dr. Ibarra, for the courtesy of this consultation.









  ______________________________________________

  Nikolas Alvarado M.D.





DR:  RN/PATEL

D:  12/04/2018 07:40

T:  12/04/2018 18:55

JOB#:  [id number]

CC:



YANNA

## 2018-12-05 VITALS — DIASTOLIC BLOOD PRESSURE: 88 MMHG | SYSTOLIC BLOOD PRESSURE: 111 MMHG

## 2018-12-05 VITALS — DIASTOLIC BLOOD PRESSURE: 80 MMHG | SYSTOLIC BLOOD PRESSURE: 115 MMHG

## 2018-12-05 VITALS — SYSTOLIC BLOOD PRESSURE: 124 MMHG | DIASTOLIC BLOOD PRESSURE: 85 MMHG

## 2018-12-05 VITALS — SYSTOLIC BLOOD PRESSURE: 114 MMHG | DIASTOLIC BLOOD PRESSURE: 83 MMHG

## 2018-12-05 VITALS — DIASTOLIC BLOOD PRESSURE: 81 MMHG | SYSTOLIC BLOOD PRESSURE: 112 MMHG

## 2018-12-05 VITALS — SYSTOLIC BLOOD PRESSURE: 138 MMHG | DIASTOLIC BLOOD PRESSURE: 90 MMHG

## 2018-12-05 VITALS — SYSTOLIC BLOOD PRESSURE: 114 MMHG | DIASTOLIC BLOOD PRESSURE: 80 MMHG

## 2018-12-05 LAB
ADD MANUAL DIFF: NO
ANION GAP SERPL CALC-SCNC: 8 MMOL/L (ref 5–15)
BASOPHILS NFR BLD AUTO: 0.4 % (ref 0–2)
BUN SERPL-MCNC: 18 MG/DL (ref 7–18)
CALCIUM SERPL-MCNC: 8.5 MG/DL (ref 8.5–10.1)
CHLORIDE SERPL-SCNC: 99 MMOL/L (ref 98–107)
CO2 SERPL-SCNC: 25 MMOL/L (ref 21–32)
CREAT SERPL-MCNC: 0.8 MG/DL (ref 0.55–1.3)
EOSINOPHIL NFR BLD AUTO: 0.1 % (ref 0–3)
ERYTHROCYTE [DISTWIDTH] IN BLOOD BY AUTOMATED COUNT: 12 % (ref 11.6–14.8)
HCT VFR BLD CALC: 39 % (ref 42–52)
HGB BLD-MCNC: 13.1 G/DL (ref 14.2–18)
LYMPHOCYTES NFR BLD AUTO: 13.6 % (ref 20–45)
MCV RBC AUTO: 74 FL (ref 80–99)
MONOCYTES NFR BLD AUTO: 5.8 % (ref 1–10)
NEUTROPHILS NFR BLD AUTO: 80.2 % (ref 45–75)
PLATELET # BLD: 223 K/UL (ref 150–450)
POTASSIUM SERPL-SCNC: 3.5 MMOL/L (ref 3.5–5.1)
RBC # BLD AUTO: 5.29 M/UL (ref 4.7–6.1)
SODIUM SERPL-SCNC: 132 MMOL/L (ref 136–145)
WBC # BLD AUTO: 16 K/UL (ref 4.8–10.8)

## 2018-12-05 RX ADMIN — HEPARIN SODIUM SCH UNITS: 5000 INJECTION INTRAVENOUS; SUBCUTANEOUS at 21:13

## 2018-12-05 RX ADMIN — METFORMIN HYDROCHLORIDE SCH MG: 500 TABLET ORAL at 16:48

## 2018-12-05 RX ADMIN — INSULIN ASPART SCH UNITS: 100 INJECTION, SOLUTION INTRAVENOUS; SUBCUTANEOUS at 16:50

## 2018-12-05 RX ADMIN — TAMSULOSIN HYDROCHLORIDE SCH MG: 0.4 CAPSULE ORAL at 21:11

## 2018-12-05 RX ADMIN — ASPIRIN SCH MG: 81 TABLET, DELAYED RELEASE ORAL at 08:22

## 2018-12-05 RX ADMIN — METFORMIN HYDROCHLORIDE SCH MG: 500 TABLET ORAL at 12:53

## 2018-12-05 RX ADMIN — INSULIN ASPART SCH UNITS: 100 INJECTION, SOLUTION INTRAVENOUS; SUBCUTANEOUS at 12:53

## 2018-12-05 RX ADMIN — SODIUM CHLORIDE SCH MLS/HR: 0.9 INJECTION INTRAVENOUS at 13:53

## 2018-12-05 RX ADMIN — INSULIN ASPART SCH UNITS: 100 INJECTION, SOLUTION INTRAVENOUS; SUBCUTANEOUS at 06:00

## 2018-12-05 RX ADMIN — INSULIN ASPART SCH UNITS: 100 INJECTION, SOLUTION INTRAVENOUS; SUBCUTANEOUS at 21:13

## 2018-12-05 RX ADMIN — HEPARIN SODIUM SCH UNITS: 5000 INJECTION INTRAVENOUS; SUBCUTANEOUS at 08:26

## 2018-12-05 RX ADMIN — Medication SCH MG: at 08:23

## 2018-12-05 RX ADMIN — INSULIN DETEMIR SCH UNITS: 100 INJECTION, SOLUTION SUBCUTANEOUS at 08:44

## 2018-12-05 RX ADMIN — DOCUSATE SODIUM SCH MG: 100 CAPSULE, LIQUID FILLED ORAL at 17:37

## 2018-12-05 RX ADMIN — METOPROLOL TARTRATE SCH MG: 25 TABLET, FILM COATED ORAL at 08:27

## 2018-12-05 RX ADMIN — DONEPEZIL HYDROCHLORIDE SCH MG: 10 TABLET, FILM COATED ORAL at 21:11

## 2018-12-05 RX ADMIN — INSULIN ASPART SCH UNITS: 100 INJECTION, SOLUTION INTRAVENOUS; SUBCUTANEOUS at 05:59

## 2018-12-05 RX ADMIN — Medication SCH EA: at 08:22

## 2018-12-05 RX ADMIN — METOPROLOL TARTRATE SCH MG: 25 TABLET, FILM COATED ORAL at 21:11

## 2018-12-05 RX ADMIN — INSULIN ASPART SCH UNITS: 100 INJECTION, SOLUTION INTRAVENOUS; SUBCUTANEOUS at 11:59

## 2018-12-05 RX ADMIN — INSULIN ASPART SCH UNITS: 100 INJECTION, SOLUTION INTRAVENOUS; SUBCUTANEOUS at 17:35

## 2018-12-05 NOTE — GENERAL PROGRESS NOTE
Assessment/Plan


Problem List:  


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


Status:  not improved, unchanged


Assessment/Plan


low dose zyprexa 2.5 mg qhs


ativan prn


the pt lacks capacity to make decisions.





Subjective


Date patient seen:  Dec 5, 2018


Neurologic/Psychiatric:  Reports: anxiety, depressed


Allergies:  


Coded Allergies:  


     No Known Allergies (Unverified , 9/5/16)


Subjective


the pt is confused and has waxing and waning of consciousness





Objective





Last 24 Hour Vital Signs








  Date Time  Temp Pulse Resp B/P (MAP) Pulse Ox O2 Delivery O2 Flow Rate FiO2


 


12/5/18 14:15 100.8 117 18 111/88 (96) 92   


 


12/5/18 12:00 100.2 113 19 124/85 (98) 94   


 


12/5/18 09:00      Room Air  


 


12/5/18 08:32 98.6 104 19 115/80 (92) 94   


 


12/5/18 08:28    115/80    


 


12/5/18 08:27  104  115/80    


 


12/5/18 04:00 98.0 105 18 114/80 (91) 95   


 


12/5/18 00:00 98.6 100 18 112/81 (91) 98   


 


12/4/18 21:17  112  123/87    


 


12/4/18 20:12      Room Air  


 


12/4/18 20:00 97.6 112 18 123/87 (99) 99   

















Intake and Output  


 


 12/4/18 12/5/18





 19:00 07:00


 


Intake Total 360 ml 100 ml


 


Balance 360 ml 100 ml


 


  


 


Intake Oral 360 ml 


 


IV Total  100 ml


 


# Voids 4 2








Laboratory Tests


12/5/18 06:10: 


White Blood Count 16.0H, Red Blood Count 5.29, Hemoglobin 13.1L, Hematocrit 

39.0L, Mean Corpuscular Volume 74L, Mean Corpuscular Hemoglobin 24.7L, Mean 

Corpuscular Hemoglobin Concent 33.5, Red Cell Distribution Width 12.0, Platelet 

Count 223, Mean Platelet Volume 6.9, Neutrophils (%) (Auto) 80.2H, Lymphocytes (

%) (Auto) 13.6L, Monocytes (%) (Auto) 5.8, Eosinophils (%) (Auto) 0.1, 

Basophils (%) (Auto) 0.4, Differential Total Cells Counted 100, Neutrophils % (

Manual) 78H, Lymphocytes % (Manual) 16L, Monocytes % (Manual) 6, Eosinophils % (

Manual) 0, Basophils % (Manual) 0, Band Neutrophils 0, Other Cell Type 

Pathologist comment, Platelet Estimate Adequate, Platelet Morphology Normal, 

Anisocytosis 1+, Microcytosis 1+, Sodium Level 132L, Potassium Level 3.5, 

Chloride Level 99, Carbon Dioxide Level 25, Anion Gap 8, Blood Urea Nitrogen 18

, Creatinine 0.8, Estimat Glomerular Filtration Rate > 60, Glucose Level 220H, 

Calcium Level 8.5, Magnesium Level 1.9, Alpha Fetoprotein [Pending]


Height (Feet):  5


Height (Inches):  6.00


Weight (Pounds):  151


General Appearance:  no apparent distress, alert, confused, agitated











Humberto Coronel MD Dec 5, 2018 15:39

## 2018-12-05 NOTE — GENERAL PROGRESS NOTE
Assessment/Plan


Problem List:  


(1) Diabetes mellitus out of control


ICD Codes:  E11.65 - Type 2 diabetes mellitus with hyperglycemia


SNOMED:  573036667


(2) hyper glycemia


(3) Peptic ulcer disease


ICD Codes:  K27.9 - Peptic ulcer, site unspecified, unspecified as acute or 

chronic, without hemorrhage or perforation


SNOMED:  41230531


Status:  progressing


Assessment/Plan


afebrile


labile dm


sugar is improving


pud


reviewed chart and labs





Subjective


ROS Limited/Unobtainable:  Yes


Allergies:  


Coded Allergies:  


     No Known Allergies (Unverified , 9/5/16)





Objective





Last 24 Hour Vital Signs








  Date Time  Temp Pulse Resp B/P (MAP) Pulse Ox O2 Delivery O2 Flow Rate FiO2


 


12/5/18 09:00      Room Air  


 


12/5/18 08:32 98.6 104 19 115/80 (92) 94   


 


12/5/18 08:28    115/80    


 


12/5/18 08:27  104  115/80    


 


12/5/18 04:00 98.0 105 18 114/80 (91) 95   


 


12/5/18 00:00 98.6 100 18 112/81 (91) 98   


 


12/4/18 21:17  112  123/87    


 


12/4/18 20:12      Room Air  


 


12/4/18 20:00 97.6 112 18 123/87 (99) 99   


 


12/4/18 12:50  115  126/93    


 


12/4/18 12:00 98.2 115 18 126/93 (104) 98   


 


12/4/18 11:45    104/74    

















Intake and Output  


 


 12/4/18 12/5/18





 19:00 07:00


 


Intake Total 360 ml 100 ml


 


Balance 360 ml 100 ml


 


  


 


Intake Oral 360 ml 


 


IV Total  100 ml


 


# Voids 4 2








Laboratory Tests


12/5/18 06:10: 


White Blood Count 16.0H, Red Blood Count 5.29, Hemoglobin 13.1L, Hematocrit 

39.0L, Mean Corpuscular Volume 74L, Mean Corpuscular Hemoglobin 24.7L, Mean 

Corpuscular Hemoglobin Concent 33.5, Red Cell Distribution Width 12.0, Platelet 

Count 223, Mean Platelet Volume 6.9, Neutrophils (%) (Auto) 80.2H, Lymphocytes (

%) (Auto) 13.6L, Monocytes (%) (Auto) 5.8, Eosinophils (%) (Auto) 0.1, 

Basophils (%) (Auto) 0.4, Differential Total Cells Counted 100, Neutrophils % (

Manual) 78H, Lymphocytes % (Manual) 16L, Monocytes % (Manual) 6, Eosinophils % (

Manual) 0, Basophils % (Manual) 0, Band Neutrophils 0, Platelet Estimate 

Adequate, Platelet Morphology Normal, Anisocytosis 1+, Microcytosis 1+, Sodium 

Level 132L, Potassium Level 3.5, Chloride Level 99, Carbon Dioxide Level 25, 

Anion Gap 8, Blood Urea Nitrogen 18, Creatinine 0.8, Estimat Glomerular 

Filtration Rate > 60, Glucose Level 220H, Calcium Level 8.5, Magnesium Level 1.9

, Alpha Fetoprotein [Pending]


Height (Feet):  5


Height (Inches):  6.00


Weight (Pounds):  151


Neck:  supple


Cardiovascular:  normal rate


Respiratory/Chest:  lungs clear


Abdomen:  soft











Sancho Ibarra MD Dec 5, 2018 11:33

## 2018-12-05 NOTE — NEPHROLOGY PROGRESS NOTE
Assessment/Plan


Problem List:  


(1) Diabetes mellitus out of control


(2) Leukocytosis


(3) Sepsis


Assessment


DM OOC


HypoNatremia


HTN


CVA


SEPSIS


Plan


Antibiotics


BS control


BP control


Monitor renal parameters


trial 3% saline and lasix





Subjective


ROS Limited/Unobtainable:  No


Constitutional:  Reports: malaise, weakness





Objective


Objective





Last 24 Hour Vital Signs








  Date Time  Temp Pulse Resp B/P (MAP) Pulse Ox O2 Delivery O2 Flow Rate FiO2


 


12/5/18 14:15 100.8 117 18 111/88 (96) 92   


 


12/5/18 12:00 100.2 113 19 124/85 (98) 94   


 


12/5/18 09:00      Room Air  


 


12/5/18 08:32 98.6 104 19 115/80 (92) 94   


 


12/5/18 08:28    115/80    


 


12/5/18 08:27  104  115/80    


 


12/5/18 04:00 98.0 105 18 114/80 (91) 95   


 


12/5/18 00:00 98.6 100 18 112/81 (91) 98   


 


12/4/18 21:17  112  123/87    


 


12/4/18 20:12      Room Air  


 


12/4/18 20:00 97.6 112 18 123/87 (99) 99   

















Intake and Output  


 


 12/4/18 12/5/18





 19:00 07:00


 


Intake Total 360 ml 100 ml


 


Balance 360 ml 100 ml


 


  


 


Intake Oral 360 ml 


 


IV Total  100 ml


 


# Voids 4 2








Laboratory Tests


12/5/18 06:10: 


White Blood Count 16.0H, Red Blood Count 5.29, Hemoglobin 13.1L, Hematocrit 

39.0L, Mean Corpuscular Volume 74L, Mean Corpuscular Hemoglobin 24.7L, Mean 

Corpuscular Hemoglobin Concent 33.5, Red Cell Distribution Width 12.0, Platelet 

Count 223, Mean Platelet Volume 6.9, Neutrophils (%) (Auto) 80.2H, Lymphocytes (

%) (Auto) 13.6L, Monocytes (%) (Auto) 5.8, Eosinophils (%) (Auto) 0.1, 

Basophils (%) (Auto) 0.4, Differential Total Cells Counted 100, Neutrophils % (

Manual) 78H, Lymphocytes % (Manual) 16L, Monocytes % (Manual) 6, Eosinophils % (

Manual) 0, Basophils % (Manual) 0, Band Neutrophils 0, Other Cell Type 

Pathologist comment, Platelet Estimate Adequate, Platelet Morphology Normal, 

Anisocytosis 1+, Microcytosis 1+, Sodium Level 132L, Potassium Level 3.5, 

Chloride Level 99, Carbon Dioxide Level 25, Anion Gap 8, Blood Urea Nitrogen 18

, Creatinine 0.8, Estimat Glomerular Filtration Rate > 60, Glucose Level 220H, 

Calcium Level 8.5, Magnesium Level 1.9, Alpha Fetoprotein [Pending]


Height (Feet):  5


Height (Inches):  6.00


Weight (Pounds):  151


General Appearance:  no apparent distress, lethargic


Cardiovascular:  tachycardia


Respiratory/Chest:  decreased breath sounds


Abdomen:  distended











Kapil Chu MD Dec 5, 2018 15:10

## 2018-12-05 NOTE — INFECTIOUS DISEASES PROG NOTE
Assessment/Plan


Assessment/Plan


A;


Sepsis, SIRS


DM with hyperglycemia


Cholelithiasis


HPN


Alzheimer disease


P:


continue Rocephin





Subjective


ROS Limited/Unobtainable:  Yes


Constitutional:  Reports: no symptoms


Allergies:  


Coded Allergies:  


     No Known Allergies (Unverified , 9/5/16)





Objective


Vital Signs





Last 24 Hour Vital Signs








  Date Time  Temp Pulse Resp B/P (MAP) Pulse Ox O2 Delivery O2 Flow Rate FiO2


 


12/5/18 09:00      Room Air  


 


12/5/18 08:32 98.6 104 19 115/80 (92) 94   


 


12/5/18 08:28    115/80    


 


12/5/18 08:27  104  115/80    


 


12/5/18 04:00 98.0 105 18 114/80 (91) 95   


 


12/5/18 00:00 98.6 100 18 112/81 (91) 98   


 


12/4/18 21:17  112  123/87    


 


12/4/18 20:12      Room Air  


 


12/4/18 20:00 97.6 112 18 123/87 (99) 99   


 


12/4/18 12:50  115  126/93    


 


12/4/18 12:00 98.2 115 18 126/93 (104) 98   


 


12/4/18 11:45    104/74    








Height (Feet):  5


Height (Inches):  6.00


Weight (Pounds):  151


HEENT:  mucous membranes moist


Respiratory/Chest:  lungs clear


Cardiovascular:  tachycardia


Abdomen:  soft, non tender


Extremities:  no edema


Neurologic/Psychiatric:  alert, responsive





Microbiology








 Date/Time


Source Procedure


Growth Status


 


 


 12/3/18 13:31


Rectum - Preliminary Resulted











Laboratory Tests








Test


  12/5/18


06:10


 


White Blood Count


  16.0 K/UL


(4.8-10.8)  H


 


Red Blood Count


  5.29 M/UL


(4.70-6.10)


 


Hemoglobin


  13.1 G/DL


(14.2-18.0)  L


 


Hematocrit


  39.0 %


(42.0-52.0)  L


 


Mean Corpuscular Volume


  74 FL (80-99)


L


 


Mean Corpuscular Hemoglobin


  24.7 PG


(27.0-31.0)  L


 


Mean Corpuscular Hemoglobin


Concent 33.5 G/DL


(32.0-36.0)


 


Red Cell Distribution Width


  12.0 %


(11.6-14.8)


 


Platelet Count


  223 K/UL


(150-450)


 


Mean Platelet Volume


  6.9 FL


(6.5-10.1)


 


Neutrophils (%) (Auto)


  80.2 %


(45.0-75.0)  H


 


Lymphocytes (%) (Auto)


  13.6 %


(20.0-45.0)  L


 


Monocytes (%) (Auto)


  5.8 %


(1.0-10.0)


 


Eosinophils (%) (Auto)


  0.1 %


(0.0-3.0)


 


Basophils (%) (Auto)


  0.4 %


(0.0-2.0)


 


Differential Total Cells


Counted 100  


 


 


Neutrophils % (Manual) 78 % (45-75)  H


 


Lymphocytes % (Manual) 16 % (20-45)  L


 


Monocytes % (Manual) 6 % (1-10)  


 


Eosinophils % (Manual) 0 % (0-3)  


 


Basophils % (Manual) 0 % (0-2)  


 


Band Neutrophils 0 % (0-8)  


 


Platelet Estimate Adequate  


 


Platelet Morphology Normal  


 


Anisocytosis 1+  


 


Microcytosis 1+  


 


Sodium Level


  132 MMOL/L


(136-145)  L


 


Potassium Level


  3.5 MMOL/L


(3.5-5.1)


 


Chloride Level


  99 MMOL/L


()


 


Carbon Dioxide Level


  25 MMOL/L


(21-32)


 


Anion Gap


  8 mmol/L


(5-15)


 


Blood Urea Nitrogen


  18 mg/dL


(7-18)


 


Creatinine


  0.8 MG/DL


(0.55-1.30)


 


Estimat Glomerular Filtration


Rate > 60 mL/min


(>60)


 


Glucose Level


  220 MG/DL


()  H


 


Calcium Level


  8.5 MG/DL


(8.5-10.1)


 


Magnesium Level


  1.9 MG/DL


(1.8-2.4)


 


Alpha Fetoprotein Pending  











Current Medications








 Medications


  (Trade)  Dose


 Ordered  Sig/Eri


 Route


 PRN Reason  Start Time


 Stop Time Status Last Admin


Dose Admin


 


 Aspirin


  (Ecotrin)  81 mg  DAILY


 ORAL


   12/4/18 09:00


 1/3/19 08:59  12/5/18 08:22


 


 


 Atorvastatin


 Calcium


  (Lipitor)  20 mg  QHS


 ORAL


   12/4/18 21:00


 1/2/19 20:59  12/4/18 21:18


 


 


 Bisacodyl


  (Dulcolax)  5 mg  DAILYPRN  PRN


 ORAL


 Constipation  12/3/18 19:00


 1/2/19 18:59   


 


 


 Ceftriaxone


 Sodium 1 gm/


 Dextrose  55 ml @ 


 110 mls/hr  Q24H


 IVPB


   12/4/18 13:00


 12/11/18 12:59  12/4/18 16:40


 


 


 Dextrose


  (Dextrose 50%)  25 ml  Q30M  PRN


 IV


 Hypoglycemia  12/4/18 07:45


 1/3/19 07:44   


 


 


 Dextrose


  (Dextrose 50%)  50 ml  Q30M  PRN


 IV


 Hypoglycemia  12/4/18 07:45


 1/3/19 07:44   


 


 


 Docusate Sodium


  (Colace)  100 mg  TIDPRN


 ORAL


   12/5/18 09:00


 1/2/19 20:59   


 


 


 Donepezil HCl


  (Aricept)  10 mg  QHS


 ORAL


   12/3/18 21:00


 1/2/19 20:59  12/4/18 21:17


 


 


 Duloxetine HCl


  (Cymbalta)  80 mg  DAILY


 ORAL


   12/4/18 09:00


 1/3/19 08:59  12/5/18 08:22


 


 


 Gadobutrol


  (Gadavist)  7.5 mmol  NOW  PRN


 IV


 Radiology Procedure  12/4/18 14:00


 12/8/18 13:51   


 


 


 Heparin Sodium


  (Porcine)


  (Heparin 5000


 units/ml)  5,000 units  EVERY 12  HOURS


 SUBQ


   12/4/18 09:00


 1/3/19 08:59  12/5/18 08:26


 


 


 Insulin Aspart


  (NovoLOG)    BEFORE MEALS AND  HS


 SUBQ


   12/3/18 21:00


 1/2/19 20:59  12/5/18 05:59


 


 


 Insulin Aspart


  (NovoLOG)  6 units  NOVOTIAC


 SUBQ


   12/5/18 11:50


 1/3/19 11:49   


 


 


 Insulin Detemir


  (Levemir)  18 units  DAILY


 SUBQ


   12/5/18 09:00


 1/3/19 08:59  12/5/18 08:44


 


 


 Iopamidol


  (Isovue-300


 100ml)  100 ml  NOW  PRN


 INJ


 Radiology Procedure  12/3/18 13:30


 12/5/18 13:29   


 


 


 Lisinopril


  (Zestril)  10 mg  DAILY


 ORAL


   12/5/18 09:00


 1/4/19 08:59   


 


 


 Magnesium


 Hydroxide


  (Mom)  30 ml  Q4H  PRN


 ORAL


 Constipation  12/3/18 20:30


 1/2/19 18:59   


 


 


 Metformin HCl


  (Glucophage)  500 mg  TIAC


 ORAL


   12/5/18 11:30


 1/4/19 11:29   


 


 


 Metoprolol


 Tartrate


  (Lopressor)  12.5 mg  Q12HR


 ORAL


   12/4/18 21:00


 1/3/19 20:59  12/4/18 21:17


 


 


 Multivitamins


 Therapeutic


  (Therapeutic


 Multivitamin)  1 ea  DAILY


 ORAL


   12/4/18 09:00


 1/3/19 08:59  12/5/18 08:22


 


 


 Pantoprazole


  (Protonix)  40 mg  DAILY


 ORAL


   12/4/18 09:00


 1/3/19 08:59  12/5/18 08:23


 


 


 Sennosides


  (Senokot)  8.6 mg  DAILY


 ORAL


   12/4/18 09:00


 1/2/19 18:59  12/5/18 08:23


 


 


 Tamsulosin HCl


  (Flomax)  0.4 mg  QHS


 ORAL


   12/4/18 21:00


 1/3/19 08:59  12/4/18 21:18


 

















Florin Bowles MD Dec 5, 2018 11:10

## 2018-12-05 NOTE — GENERAL PROGRESS NOTE
Assessment/Plan


Problem List:  


(1) Diabetes mellitus out of control


ICD Codes:  E11.65 - Type 2 diabetes mellitus with hyperglycemia


SNOMED:  603245431


(2) hyper glycemia


Assessment/Plan


increase Levemir to 18 units  


increase Novolog to 6 units ac tid 


continue NISS


Metformin will be started today





Subjective


Allergies:  


Coded Allergies:  


     No Known Allergies (Unverified , 9/5/16)


All Systems:  reviewed and negative except above


Subjective


events noted





Objective





Last 24 Hour Vital Signs








  Date Time  Temp Pulse Resp B/P (MAP) Pulse Ox O2 Delivery O2 Flow Rate FiO2


 


12/5/18 04:00 98.0 105 18 114/80 (91) 95   


 


12/5/18 00:00 98.6 100 18 112/81 (91) 98   


 


12/4/18 21:17  112  123/87    


 


12/4/18 20:12      Room Air  


 


12/4/18 20:00 97.6 112 18 123/87 (99) 99   


 


12/4/18 12:50  115  126/93    


 


12/4/18 12:00 98.2 115 18 126/93 (104) 98   


 


12/4/18 11:45    104/74    


 


12/4/18 09:00      Room Air  


 


12/4/18 08:45  78  153/91 (111)    


 


12/4/18 08:00 98.5 95 19 161/95 (117) 94   

















Intake and Output  


 


 12/4/18 12/5/18





 18:59 06:59


 


Intake Total 360 ml 100 ml


 


Balance 360 ml 100 ml


 


  


 


Intake Oral 360 ml 


 


IV Total  100 ml


 


# Voids 4 2








Laboratory Tests


12/5/18 06:10: 


White Blood Count 16.0H, Red Blood Count 5.29, Hemoglobin 13.1L, Hematocrit 

39.0L, Mean Corpuscular Volume 74L, Mean Corpuscular Hemoglobin 24.7L, Mean 

Corpuscular Hemoglobin Concent 33.5, Red Cell Distribution Width 12.0, Platelet 

Count 223, Mean Platelet Volume 6.9, Neutrophils (%) (Auto) 80.2H, Lymphocytes (

%) (Auto) 13.6L, Monocytes (%) (Auto) 5.8, Eosinophils (%) (Auto) 0.1, 

Basophils (%) (Auto) 0.4, Sodium Level 132L, Potassium Level 3.5, Chloride 

Level 99, Carbon Dioxide Level 25, Anion Gap 8, Blood Urea Nitrogen 18, 

Creatinine 0.8, Estimat Glomerular Filtration Rate > 60, Glucose Level 220H, 

Calcium Level 8.5, Magnesium Level 1.9, Alpha Fetoprotein [Pending]


Height (Feet):  5


Height (Inches):  6.00


Weight (Pounds):  151


General Appearance:  no apparent distress


Neck:  normal alignment


Cardiovascular:  regular rhythm


Respiratory/Chest:  lungs clear


Objective





Current Medications








 Medications


  (Trade)  Dose


 Ordered  Sig/Eri


 Route


 PRN Reason  Start Time


 Stop Time Status Last Admin


Dose Admin


 


 Aspirin


  (Ecotrin)  81 mg  DAILY


 ORAL


   12/4/18 09:00


 1/3/19 08:59  12/4/18 09:09


 


 


 Atorvastatin


 Calcium


  (Lipitor)  20 mg  QHS


 ORAL


   12/4/18 21:00


 1/2/19 20:59  12/4/18 21:18


 


 


 Bisacodyl


  (Dulcolax)  5 mg  DAILYPRN  PRN


 ORAL


 Constipation  12/3/18 19:00


 1/2/19 18:59   


 


 


 Ceftriaxone


 Sodium 1 gm/


 Dextrose  55 ml @ 


 110 mls/hr  Q24H


 IVPB


   12/4/18 13:00


 12/11/18 12:59  12/4/18 16:40


 


 


 Dextrose


  (Dextrose 50%)  25 ml  Q30M  PRN


 IV


 Hypoglycemia  12/4/18 07:45


 1/3/19 07:44   


 


 


 Dextrose


  (Dextrose 50%)  50 ml  Q30M  PRN


 IV


 Hypoglycemia  12/4/18 07:45


 1/3/19 07:44   


 


 


 Docusate Sodium


  (Colace)  100 mg  TIDPRN


 ORAL


   12/5/18 09:00


 1/2/19 20:59   


 


 


 Donepezil HCl


  (Aricept)  10 mg  QHS


 ORAL


   12/3/18 21:00


 1/2/19 20:59  12/4/18 21:17


 


 


 Duloxetine HCl


  (Cymbalta)  80 mg  DAILY


 ORAL


   12/4/18 09:00


 1/3/19 08:59  12/4/18 09:08


 


 


 Gadobutrol


  (Gadavist)  7.5 mmol  NOW  PRN


 IV


 Radiology Procedure  12/4/18 14:00


 12/8/18 13:51   


 


 


 Heparin Sodium


  (Porcine)


  (Heparin 5000


 units/ml)  5,000 units  EVERY 12  HOURS


 SUBQ


   12/4/18 09:00


 1/3/19 08:59  12/4/18 21:19


 


 


 Insulin Aspart


  (NovoLOG)    BEFORE MEALS AND  HS


 SUBQ


   12/3/18 21:00


 1/2/19 20:59  12/5/18 05:59


 


 


 Insulin Aspart


  (NovoLOG)  5 units  NOVOTIAC


 SUBQ


   12/4/18 11:50


 1/3/19 11:49  12/5/18 06:00


 


 


 Insulin Detemir


  (Levemir)  15 units  DAILY


 SUBQ


   12/4/18 09:00


 1/3/19 08:59  12/4/18 09:10


 


 


 Iopamidol


  (Isovue-300


 100ml)  100 ml  NOW  PRN


 INJ


 Radiology Procedure  12/3/18 13:30


 12/5/18 13:29   


 


 


 Lisinopril


  (Zestril)  10 mg  DAILY


 ORAL


   12/5/18 09:00


 1/4/19 08:59   


 


 


 Magnesium


 Hydroxide


  (Mom)  30 ml  Q4H  PRN


 ORAL


 Constipation  12/3/18 20:30


 1/2/19 18:59   


 


 


 Metformin HCl


  (Glucophage)  500 mg  TIAC


 ORAL


   12/5/18 11:30


 1/4/19 11:29   


 


 


 Metoprolol


 Tartrate


  (Lopressor)  12.5 mg  Q12HR


 ORAL


   12/4/18 21:00


 1/3/19 20:59  12/4/18 21:17


 


 


 Multivitamins


 Therapeutic


  (Therapeutic


 Multivitamin)  1 ea  DAILY


 ORAL


   12/4/18 09:00


 1/3/19 08:59  12/4/18 09:09


 


 


 Pantoprazole


  (Protonix)  40 mg  DAILY


 ORAL


   12/4/18 09:00


 1/3/19 08:59  12/4/18 09:09


 


 


 Sennosides


  (Senokot)  8.6 mg  DAILY


 ORAL


   12/4/18 09:00


 1/2/19 18:59  12/4/18 09:09


 


 


 Tamsulosin HCl


  (Flomax)  0.4 mg  QHS


 ORAL


   12/4/18 21:00


 1/3/19 08:59  12/4/18 21:18


 














Item Value  Date Time


 


Bedside Blood Glucose 217 mg/dl H 12/5/18 0635


 


Bedside Blood Glucose 252 mg/dl H 12/4/18 2130


 


Bedside Blood Glucose 268 mg/dl H 12/4/18 1643


 


Bedside Blood Glucose 274 mg/dl H 12/4/18 1242


 


Bedside Blood Glucose 264 mg/dl H 12/4/18 0910


 


Bedside Blood Glucose 214 mg/dl H 12/4/18 0556

















Nikolas Alvarado MD Dec 5, 2018 07:22

## 2018-12-05 NOTE — CONSULTATION
DATE OF CONSULTATION:  12/05/2018

HEMATOLOGY/ONCOLOGY CONSULTATION



CONSULTING PHYSICIAN:  Philippe Laws M.D.



REQUESTING PHYSICIAN:  Sancho Ibarra M.D.



REASON FOR CONSULTATION:  Evaluation of anemia and leukocytosis.



IDENTIFICATION:  Dear Dr. Ibarra:



The patient is a 68-year-old  male with past medical history

significant for uncontrolled diabetes, blood sugar 216 upon admission to

the hospital.  Endocrinology Service has been consulted, noted to have

leukocytosis of 15,000.  Complaining of abdominal pain.  CT scan was

completed of the abdomen and pelvis as well as MRI of the abdomen and

showed left lobe liver lesion, no longer noted on this MRI compared to

prior imaging.  Chest x-ray was reviewed and previously demonstrated

atelectasis _____ noted.  In addition, the patient's blood sugar is

currently better controlled this morning 217.  Hematology Service was

consulted for further evaluation of leukocytosis.



PAST MEDICAL HISTORY:  Diabetes, hypertension, CAD, and TIA.



PAST SURGICAL HISTORY:  None noted.



FAMILY HISTORY:  Noncontributory.



SOCIAL HISTORY:  Lives in a skilled nursing facility.



REVIEW OF SYSTEMS:  Difficult to obtain.



LABORATORY DATA:  WBC 16.2, hemoglobin 13, hematocrit 39, and platelet

count 240,000.  BUN of 6 and creatinine 0.6.  A1c of 9.7.  Uric acid 2.2.

Magnesium 1.4.  CRP of 5.



ASSESSMENT AND RECOMMENDATIONS:

1. Leukocytosis secondary to hyperosmolar hyperglycemia.  The patient

started on Levemir as well as NovoLog 5 units before each meal and

metformin as well and insulin sliding scale.  Closely monitored for

improvement.  A1c was checked and it was 9.7.  Therefore, poorly

controlled.

2. Anemia due to underlying chronic disease.  Continue to closely

monitor.

3. Hyperglycemia as per Endocrinology followup.

4. Abdominal pain.  Imaging has been reviewed.  No evidence of

infectious etiology at this time.

5. Hypertension, systolic blood pressure _____.

6. Recent EGD and colonoscopy in March 2018.  Some erosions noted.



I appreciate the consultation.









  ______________________________________________

  Philippe Laws M.D.





DR:  JULIANNA

D:  12/05/2018 06:49

T:  12/05/2018 16:02

JOB#:  [id number]

CC:

## 2018-12-05 NOTE — GENERAL PROGRESS NOTE
Assessment/Plan


Problem List:  


(1) Cholelithiasis


ICD Codes:  K80.20 - Calculus of gallbladder without cholecystitis without 

obstruction


SNOMED:  964010134


(2) Liver lesion


ICD Codes:  K76.9 - Liver disease, unspecified


SNOMED:  728929351


(3) Peptic ulcer disease


ICD Codes:  K27.9 - Peptic ulcer, site unspecified, unspecified as acute or 

chronic, without hemorrhage or perforation


SNOMED:  93522064


(4) Diabetes mellitus out of control


ICD Codes:  E11.65 - Type 2 diabetes mellitus with hyperglycemia


SNOMED:  941441284


Assessment/Plan


MRI reviewed, the prior liver lesion not seen


FU AFP


fu labs


DM control


recent GI procedures, so no plans for this admission





Subjective


Allergies:  


Coded Allergies:  


     No Known Allergies (Unverified , 9/5/16)





Objective





Last 24 Hour Vital Signs








  Date Time  Temp Pulse Resp B/P (MAP) Pulse Ox O2 Delivery O2 Flow Rate FiO2


 


12/5/18 08:32 98.6 104 19 115/80 (92) 94   


 


12/5/18 08:28    115/80    


 


12/5/18 08:27  104  115/80    


 


12/5/18 04:00 98.0 105 18 114/80 (91) 95   


 


12/5/18 00:00 98.6 100 18 112/81 (91) 98   


 


12/4/18 21:17  112  123/87    


 


12/4/18 20:12      Room Air  


 


12/4/18 20:00 97.6 112 18 123/87 (99) 99   


 


12/4/18 12:50  115  126/93    


 


12/4/18 12:00 98.2 115 18 126/93 (104) 98   


 


12/4/18 11:45    104/74    

















Intake and Output  


 


 12/4/18 12/5/18





 19:00 07:00


 


Intake Total 360 ml 100 ml


 


Balance 360 ml 100 ml


 


  


 


Intake Oral 360 ml 


 


IV Total  100 ml


 


# Voids 4 2








Laboratory Tests


12/5/18 06:10: 


White Blood Count 16.0H, Red Blood Count 5.29, Hemoglobin 13.1L, Hematocrit 

39.0L, Mean Corpuscular Volume 74L, Mean Corpuscular Hemoglobin 24.7L, Mean 

Corpuscular Hemoglobin Concent 33.5, Red Cell Distribution Width 12.0, Platelet 

Count 223, Mean Platelet Volume 6.9, Neutrophils (%) (Auto) 80.2H, Lymphocytes (

%) (Auto) 13.6L, Monocytes (%) (Auto) 5.8, Eosinophils (%) (Auto) 0.1, 

Basophils (%) (Auto) 0.4, Neutrophils % (Manual) [Pending], Lymphocytes % (

Manual) [Pending], Platelet Estimate [Pending], Platelet Morphology [Pending], 

Sodium Level 132L, Potassium Level 3.5, Chloride Level 99, Carbon Dioxide Level 

25, Anion Gap 8, Blood Urea Nitrogen 18, Creatinine 0.8, Estimat Glomerular 

Filtration Rate > 60, Glucose Level 220H, Calcium Level 8.5, Magnesium Level 1.9

, Alpha Fetoprotein [Pending]





Procedure: MRI Abdomen w Contrast


Indication: Cholelithiasis, abdominal pain, possible mass on prior liver MRI


 


Technique: Axial and coronal single shot fast spin echo,, axial T2 FRFSE


fat-saturated, axial 3-D dual echo, axial 2-D FIESTA, 2-D thick slab MRCP, 

coronal


3-D MRCP, pre and postcontrast axial LAVA Flex, postcontrast coronal LAVA flex 

images


of the abdomen


 


Comparison: 12/3/2018 CT scan, 9/12/2016 the gallbladder demonstrates 

gallstones. MRI


 


Findings: Exam is limited due to significant motion artifact. Tiny T2 

hyperintense


lesion is seen in posterior segment 2 of the liver, seen only on the axial T2


fat-saturated images, and not on any the other sequences. The larger lesion 

described


on the prior MRI is not evident currently. No other liver lesions are evident


 


Again demonstrated are gallstones. No biliary ductal dilatation or intraluminal


biliary ductal filling defects. The pancreatic duct is normal in caliber and


appearance. The pancreas, spleen, adrenals, kidneys are unremarkable. There is


suggestion of some free fluid in the right paracolic gutter a T2 fat-saturated 

images


only; suspect that this is artifactual as no fluid is seen in this area on other


sequences or on prior CT.


 


Impression: Limited exam, as described


 


Previously reported (in 2016) left lobe liver lesion is no longer evident, may 

have


been a cyst that involuted, versus artifact.


 


Cholelithiasis. No evidence of biliary ductal dilatation or choledocholithiasis


 


Possible tiny left lobe liver cyst, seen only a single sequence





Height (Feet):  5


Height (Inches):  6.00


Weight (Pounds):  151


General Appearance:  no apparent distress


EENT:  normal ENT inspection


Neck:  supple


Cardiovascular:  normal rate


Respiratory/Chest:  decreased breath sounds


Abdomen:  normal bowel sounds, non tender, soft


Extremities:  non-tender











Mikel Anne MD Dec 5, 2018 09:19

## 2018-12-06 VITALS — DIASTOLIC BLOOD PRESSURE: 71 MMHG | SYSTOLIC BLOOD PRESSURE: 107 MMHG

## 2018-12-06 VITALS — DIASTOLIC BLOOD PRESSURE: 72 MMHG | SYSTOLIC BLOOD PRESSURE: 106 MMHG

## 2018-12-06 VITALS — DIASTOLIC BLOOD PRESSURE: 57 MMHG | SYSTOLIC BLOOD PRESSURE: 104 MMHG

## 2018-12-06 VITALS — SYSTOLIC BLOOD PRESSURE: 106 MMHG | DIASTOLIC BLOOD PRESSURE: 81 MMHG

## 2018-12-06 VITALS — DIASTOLIC BLOOD PRESSURE: 78 MMHG | SYSTOLIC BLOOD PRESSURE: 117 MMHG

## 2018-12-06 VITALS — DIASTOLIC BLOOD PRESSURE: 76 MMHG | SYSTOLIC BLOOD PRESSURE: 117 MMHG

## 2018-12-06 VITALS — DIASTOLIC BLOOD PRESSURE: 81 MMHG | SYSTOLIC BLOOD PRESSURE: 126 MMHG

## 2018-12-06 RX ADMIN — DOCUSATE SODIUM SCH MG: 100 CAPSULE, LIQUID FILLED ORAL at 09:05

## 2018-12-06 RX ADMIN — METFORMIN HYDROCHLORIDE SCH MG: 500 TABLET ORAL at 11:53

## 2018-12-06 RX ADMIN — HEPARIN SODIUM SCH UNITS: 5000 INJECTION INTRAVENOUS; SUBCUTANEOUS at 09:07

## 2018-12-06 RX ADMIN — TAMSULOSIN HYDROCHLORIDE SCH MG: 0.4 CAPSULE ORAL at 20:32

## 2018-12-06 RX ADMIN — INSULIN ASPART SCH UNITS: 100 INJECTION, SOLUTION INTRAVENOUS; SUBCUTANEOUS at 12:14

## 2018-12-06 RX ADMIN — INSULIN ASPART SCH UNITS: 100 INJECTION, SOLUTION INTRAVENOUS; SUBCUTANEOUS at 05:38

## 2018-12-06 RX ADMIN — METOPROLOL TARTRATE SCH MG: 25 TABLET, FILM COATED ORAL at 08:58

## 2018-12-06 RX ADMIN — LISINOPRIL SCH MG: 2.5 TABLET ORAL at 08:58

## 2018-12-06 RX ADMIN — METOPROLOL TARTRATE SCH MG: 25 TABLET, FILM COATED ORAL at 20:35

## 2018-12-06 RX ADMIN — DONEPEZIL HYDROCHLORIDE SCH MG: 10 TABLET, FILM COATED ORAL at 20:32

## 2018-12-06 RX ADMIN — METFORMIN HYDROCHLORIDE SCH MG: 500 TABLET ORAL at 05:35

## 2018-12-06 RX ADMIN — INSULIN DETEMIR SCH UNITS: 100 INJECTION, SOLUTION SUBCUTANEOUS at 09:08

## 2018-12-06 RX ADMIN — INSULIN ASPART SCH UNITS: 100 INJECTION, SOLUTION INTRAVENOUS; SUBCUTANEOUS at 20:39

## 2018-12-06 RX ADMIN — DOCUSATE SODIUM SCH MG: 100 CAPSULE, LIQUID FILLED ORAL at 18:31

## 2018-12-06 RX ADMIN — SODIUM CHLORIDE SCH MLS/HR: 0.9 INJECTION INTRAVENOUS at 14:32

## 2018-12-06 RX ADMIN — METFORMIN HYDROCHLORIDE SCH MG: 500 TABLET ORAL at 17:12

## 2018-12-06 RX ADMIN — INSULIN ASPART SCH UNITS: 100 INJECTION, SOLUTION INTRAVENOUS; SUBCUTANEOUS at 16:30

## 2018-12-06 RX ADMIN — DOCUSATE SODIUM SCH MG: 100 CAPSULE, LIQUID FILLED ORAL at 13:05

## 2018-12-06 RX ADMIN — Medication SCH EA: at 09:06

## 2018-12-06 RX ADMIN — Medication SCH MG: at 09:06

## 2018-12-06 RX ADMIN — ASPIRIN SCH MG: 81 TABLET, DELAYED RELEASE ORAL at 09:06

## 2018-12-06 RX ADMIN — INSULIN ASPART SCH UNITS: 100 INJECTION, SOLUTION INTRAVENOUS; SUBCUTANEOUS at 16:50

## 2018-12-06 RX ADMIN — HEPARIN SODIUM SCH UNITS: 5000 INJECTION INTRAVENOUS; SUBCUTANEOUS at 20:37

## 2018-12-06 NOTE — GENERAL PROGRESS NOTE
Assessment/Plan


Problem List:  


(1) Diabetes mellitus out of control


ICD Codes:  E11.65 - Type 2 diabetes mellitus with hyperglycemia


SNOMED:  660198922


(2) hyper glycemia


Assessment/Plan


continue Levemir 18 units  


continue Novolog 6 units ac tid 


continue Metformin 500 mg tid 


continue NISS





Subjective


Allergies:  


Coded Allergies:  


     No Known Allergies (Unverified , 9/5/16)


All Systems:  reviewed and negative except above


Subjective


events noted





Objective





Last 24 Hour Vital Signs








  Date Time  Temp Pulse Resp B/P (MAP) Pulse Ox O2 Delivery O2 Flow Rate FiO2


 


12/6/18 04:00 97.6 72 16 104/57 (73) 94   


 


12/6/18 00:00 97.7 88 16 117/76 (90) 95   


 


12/5/18 21:11  104  114/83    


 


12/5/18 21:00      Room Air  


 


12/5/18 20:00 99.2 104 18 114/83 (93) 96   


 


12/5/18 16:32 98.3 114 20 138/90 (106) 95   


 


12/5/18 15:41 98.3       


 


12/5/18 14:15 100.8 117 18 111/88 (96) 92   


 


12/5/18 12:00 100.2 113 19 124/85 (98) 94   


 


12/5/18 09:00      Room Air  


 


12/5/18 08:32 98.6 104 19 115/80 (92) 94   


 


12/5/18 08:28    115/80    


 


12/5/18 08:27  104  115/80    

















Intake and Output  


 


 12/5/18 12/6/18





 18:59 06:59


 


Intake Total 545 ml 


 


Output Total 250 ml 600 ml


 


Balance 295 ml -600 ml


 


  


 


Intake Oral 545 ml 


 


Output Urine Total 250 ml 600 ml


 


# Voids 2 








Height (Feet):  5


Height (Inches):  6.00


Weight (Pounds):  151


General Appearance:  no apparent distress


Neck:  normal alignment


Cardiovascular:  normal rate


Respiratory/Chest:  lungs clear


Abdomen:  normal bowel sounds


Pelvis:  normal external exam


Objective





Current Medications








 Medications


  (Trade)  Dose


 Ordered  Sig/Eri


 Route


 PRN Reason  Start Time


 Stop Time Status Last Admin


Dose Admin


 


 Acetaminophen


  (Tylenol)  650 mg  Q6H  PRN


 ORAL


 Mild Pain/Temp > 100.5  12/5/18 14:15


 1/4/19 14:14  12/5/18 15:11


 


 


 Aspirin


  (Ecotrin)  81 mg  DAILY


 ORAL


   12/4/18 09:00


 1/3/19 08:59  12/5/18 08:22


 


 


 Atorvastatin


 Calcium


  (Lipitor)  10 mg  QHS


 ORAL


   12/5/18 21:00


 1/2/19 20:59  12/5/18 21:11


 


 


 Bisacodyl


  (Dulcolax)  5 mg  DAILYPRN  PRN


 ORAL


 Constipation  12/3/18 19:00


 1/2/19 18:59   


 


 


 Ceftriaxone


 Sodium 1 gm/


 Dextrose  55 ml @ 


 110 mls/hr  Q24H


 IVPB


   12/4/18 13:00


 12/11/18 12:59  12/5/18 13:53


 


 


 Dextrose


  (Dextrose 50%)  25 ml  Q30M  PRN


 IV


 Hypoglycemia  12/4/18 07:45


 1/3/19 07:44   


 


 


 Dextrose


  (Dextrose 50%)  50 ml  Q30M  PRN


 IV


 Hypoglycemia  12/4/18 07:45


 1/3/19 07:44   


 


 


 Docusate Sodium


  (Colace)  100 mg  TID


 ORAL


   12/5/18 18:00


 1/2/19 20:59  12/5/18 17:37


 


 


 Donepezil HCl


  (Aricept)  10 mg  QHS


 ORAL


   12/3/18 21:00


 1/2/19 20:59  12/5/18 21:11


 


 


 Duloxetine HCl


  (Cymbalta)  80 mg  DAILY


 ORAL


   12/4/18 09:00


 1/3/19 08:59  12/5/18 08:22


 


 


 Furosemide


  (Lasix)  10 mg  EVERY 6  HOURS


 IV


   12/5/18 18:00


 1/4/19 17:59  12/6/18 05:35


 


 


 Gadobutrol


  (Gadavist)  7.5 mmol  NOW  PRN


 IV


 Radiology Procedure  12/4/18 14:00


 12/8/18 13:51   


 


 


 Heparin Sodium


  (Porcine)


  (Heparin 5000


 units/ml)  5,000 units  EVERY 12  HOURS


 SUBQ


   12/4/18 09:00


 1/3/19 08:59  12/5/18 21:13


 


 


 Insulin Aspart


  (NovoLOG)    BEFORE MEALS AND  HS


 SUBQ


   12/3/18 21:00


 1/2/19 20:59  12/6/18 05:38


 


 


 Insulin Aspart


  (NovoLOG)  6 units  NOVOTIAC


 SUBQ


   12/5/18 11:50


 1/3/19 11:49  12/6/18 05:38


 


 


 Insulin Detemir


  (Levemir)  18 units  DAILY


 SUBQ


   12/5/18 09:00


 1/3/19 08:59  12/5/18 08:44


 


 


 Lisinopril


  (Zestril)  5 mg  DAILY


 ORAL


   12/6/18 09:00


 1/4/19 08:59   


 


 


 Magnesium


 Hydroxide


  (Mom)  30 ml  Q4H  PRN


 ORAL


 Constipation  12/3/18 20:30


 1/2/19 18:59   


 


 


 Metformin HCl


  (Glucophage)  500 mg  TIAC


 ORAL


   12/5/18 11:30


 1/4/19 11:29  12/6/18 05:35


 


 


 Metoprolol


 Tartrate


  (Lopressor)  25 mg  Q12HR


 ORAL


   12/5/18 21:00


 1/3/19 20:59  12/5/18 21:11


 


 


 Multivitamins


 Therapeutic


  (Therapeutic


 Multivitamin)  1 ea  DAILY


 ORAL


   12/4/18 09:00


 1/3/19 08:59  12/5/18 08:22


 


 


 Pantoprazole


  (Protonix)  40 mg  DAILY


 ORAL


   12/4/18 09:00


 1/3/19 08:59  12/5/18 08:23


 


 


 Sennosides


  (Senokot)  8.6 mg  DAILY


 ORAL


   12/4/18 09:00


 1/2/19 18:59  12/5/18 08:23


 


 


 Sodium Chloride  500 ml @ 


 30 mls/hr  ONCE  ONCE


 IV


   12/5/18 16:00


 12/6/18 08:39  12/5/18 16:49


 


 


 Tamsulosin HCl


  (Flomax)  0.4 mg  QHS


 ORAL


   12/4/18 21:00


 1/3/19 08:59  12/5/18 21:11


 














Item Value  Date Time


 


Bedside Blood Glucose 162 mg/dl H 12/6/18 0630


 


Bedside Blood Glucose 200 mg/dl H 12/5/18 2113


 


Bedside Blood Glucose 188 mg/dl H 12/5/18 1735


 


Bedside Blood Glucose 174 mg/dl H 12/5/18 1253


 


Bedside Blood Glucose 217 mg/dl H 12/5/18 0844

















Nikolas Alvarado MD Dec 6, 2018 07:07

## 2018-12-06 NOTE — GI PROGRESS NOTE
Assessment/Plan


Problems:  


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


(2) Liver lesion


ICD Codes:  K76.9 - Liver disease, unspecified


SNOMED:  675630350


(3) Peptic ulcer disease


ICD Codes:  K27.9 - Peptic ulcer, site unspecified, unspecified as acute or 

chronic, without hemorrhage or perforation


SNOMED:  44176296


(4) hyper glycemia


(5) Cholelithiasis


ICD Codes:  K80.20 - Calculus of gallbladder without cholecystitis without 

obstruction


SNOMED:  820376176


(6) Coffee ground emesis


ICD Codes:  K92.0 - Hematemesis


SNOMED:  85872410


Status:  stable


Status Narrative


Seen with Dr. Anne.


Assessment/Plan


recent EGD/colonoscopy this year March 2018.


1. A 3 to 4 cm hiatal hernia.


2. Lower esophageal reflux erosions consistent with gastroesophageal reflux.


3. Normal colonoscopy including 10 cm of terminal ileum, although visualization 

was somewhat suboptimal.


history of iron deficiency


MRI reviewed, the prior liver lesion not seen





recent GI procedures, so no plans for this admission


DM management


advance diet to ADA diet


MRI ordered to evaluate liver mass.


ppi


zofran prn


fu labs, AFP





The patient was seen and examined at bedside and all new and available data was 

reviewed in the patients chart. I agree with the above findings, impression 

and plan.  (Patient seen earlier today. Signature stamp does not reflect 

patient encounter time.). - Mikel Anne MD





Subjective


Gastrointestinal/Abdominal:  Reports: no symptoms





Objective





Last 24 Hour Vital Signs








  Date Time  Temp Pulse Resp B/P (MAP) Pulse Ox O2 Delivery O2 Flow Rate FiO2


 


12/6/18 08:58  90  106/81    


 


12/6/18 08:58    106/81    


 


12/6/18 08:57  90  106/81 (89)    


 


12/6/18 08:00 98.2 82 18 106/72 (83) 93   


 


12/6/18 04:00 97.6 72 16 104/57 (73) 94   


 


12/6/18 00:00 97.7 88 16 117/76 (90) 95   


 


12/5/18 21:11  104  114/83    


 


12/5/18 21:00      Room Air  


 


12/5/18 20:00 99.2 104 18 114/83 (93) 96   


 


12/5/18 16:32 98.3 114 20 138/90 (106) 95   


 


12/5/18 15:41 98.3       


 


12/5/18 14:15 100.8 117 18 111/88 (96) 92   


 


12/5/18 12:00 100.2 113 19 124/85 (98) 94   

















Intake and Output  


 


 12/5/18 12/6/18





 18:59 06:59


 


Intake Total 545 ml 


 


Output Total 250 ml 600 ml


 


Balance 295 ml -600 ml


 


  


 


Intake Oral 545 ml 


 


Output Urine Total 250 ml 600 ml


 


# Voids 2 








Height (Feet):  5


Height (Inches):  6.00


Weight (Pounds):  151


General Appearance:  WD/WN, no apparent distress, alert


Cardiovascular:  normal rate


Respiratory/Chest:  normal breath sounds, no respiratory distress


Abdominal Exam:  normal bowel sounds, non tender, soft


Extremities:  normal range of motion, non-tender











Yocasta Richards NP Dec 6, 2018 10:16

## 2018-12-06 NOTE — GENERAL PROGRESS NOTE
Assessment/Plan


Status:  stable


Assessment/Plan


1. Leukocytosis secondary to sepsis. ID is following, appreciate recs. 


--> Closely monitor and trend wbc for improvement.  


--> Peripheral has been ordered, results are pending 


--> Medications have been reviewed 


--> Imaging has been reviewed, reveals cholelithiasis


--> Blood cultures and urine cultures are pending. 


--> Pt on IV abx. 


2. Anemia due to underlying chronic disease. Current Hgb goal >13, no w/u 

required at this time. 


--> Continue to closely monitor for stability. 


3. Hyperglycemia, endocrinology is following, appreciate recs.


--> The patient started on Levemir as well as NovoLog 5 units before each meal 

and metformin as well and insulin sliding scale.


--> A1c was checked and it was 9.7. Therefore, poorly controlled.


4. Abdominal pain.  Imaging has been reviewed.  No evidence of infectious 

etiology at this time.


5. Hypertension. 


6. Recent EGD and colonoscopy in March 2018.  Some erosions noted.





GREATLY APPRECIATE CONSULTATION.





Subjective


Date patient seen:  Dec 6, 2018


Hematologic/Lymphatic:  Reports: anemia


Allergies:  


Coded Allergies:  


     No Known Allergies (Unverified , 9/5/16)


All Systems:  reviewed and negative except above


Subjective


Pt awake and alert. No acute events. WBC remains elevated, on abx.





Objective





Last 24 Hour Vital Signs








  Date Time  Temp Pulse Resp B/P (MAP) Pulse Ox O2 Delivery O2 Flow Rate FiO2


 


12/6/18 09:00      Room Air  


 


12/6/18 08:58  90  106/81    


 


12/6/18 08:58    106/81    


 


12/6/18 08:57  90  106/81 (89)    


 


12/6/18 08:00 98.2 82 18 106/72 (83) 93   


 


12/6/18 04:00 97.6 72 16 104/57 (73) 94   


 


12/6/18 00:00 97.7 88 16 117/76 (90) 95   


 


12/5/18 21:11  104  114/83    


 


12/5/18 21:00      Room Air  


 


12/5/18 20:00 99.2 104 18 114/83 (93) 96   


 


12/5/18 16:32 98.3 114 20 138/90 (106) 95   


 


12/5/18 15:41 98.3       


 


12/5/18 14:15 100.8 117 18 111/88 (96) 92   

















Intake and Output  


 


 12/5/18 12/6/18





 19:00 07:00


 


Intake Total 545 ml 


 


Output Total 250 ml 600 ml


 


Balance 295 ml -600 ml


 


  


 


Intake Oral 545 ml 


 


Output Urine Total 250 ml 600 ml


 


# Voids 2 








Height (Feet):  5


Height (Inches):  6.00


Weight (Pounds):  151


Objective


General Appearance:  well appearing, no apparent distress, lethargic


Head:  normocephalic


EENT:  PERRL/EOMI, normal ENT inspection


Neck:  supple


Respiratory:  normal breath sounds, no respiratory distress


Cardiovascular:  normal rate


Gastrointestinal:  normal inspection, non tender, soft, normal bowel sounds, non

-distended


Rectal:  deferred


Genitourinary:  deferred


Musculoskeletal:  normal inspection, back normal


Neurologic:  alert, responsive


Psychiatric:  normal inspection, judgement/insight normal, memory normal


Skin:  normal inspection, normal color, no rash, warm/dry, palpation normal, 

well hydrated


Lymphatic:  normal inspection, no adenopathy











Philippe Laws MD Dec 6, 2018 13:02

## 2018-12-06 NOTE — INFECTIOUS DISEASES PROG NOTE
Assessment/Plan


Assessment/Plan


A;


Sepsis, SIRS


DM with hyperglycemia


Cholelithiasis


HPN


Alzheimer disease


Fever


P:


continue Rocephin





Subjective


ROS Limited/Unobtainable:  Yes


Constitutional:  Reports: no symptoms


Gastrointestinal/Abdominal:  Reports: other - RUQ pain


Allergies:  


Coded Allergies:  


     No Known Allergies (Unverified , 9/5/16)





Objective


Vital Signs





Last 24 Hour Vital Signs








  Date Time  Temp Pulse Resp B/P (MAP) Pulse Ox O2 Delivery O2 Flow Rate FiO2


 


12/6/18 09:00      Room Air  


 


12/6/18 08:58  90  106/81    


 


12/6/18 08:58    106/81    


 


12/6/18 08:57  90  106/81 (89)    


 


12/6/18 08:00 98.2 82 18 106/72 (83) 93   


 


12/6/18 04:00 97.6 72 16 104/57 (73) 94   


 


12/6/18 00:00 97.7 88 16 117/76 (90) 95   


 


12/5/18 21:11  104  114/83    


 


12/5/18 21:00      Room Air  


 


12/5/18 20:00 99.2 104 18 114/83 (93) 96   


 


12/5/18 16:32 98.3 114 20 138/90 (106) 95   


 


12/5/18 15:41 98.3       


 


12/5/18 14:15 100.8 117 18 111/88 (96) 92   








Height (Feet):  5


Height (Inches):  6.00


Weight (Pounds):  151


General Appearance:  no acute distress


Respiratory/Chest:  lungs clear


Cardiovascular:  normal rate


Abdomen:  soft, non tender


Extremities:  no edema


Neurologic/Psychiatric:  alert, responsive





Microbiology








 Date/Time


Source Procedure


Growth Status


 


 


 12/3/18 13:31


Nasal Nares MRSA Culture - Final


NO METHICILLIN RESISTANT STAPH AUREUS... Complete


 


 12/3/18 13:31


Rectum - Final


NO CARBAPENEM-RESISTANT ENTEROBACTERI... Complete


 


 12/3/18 13:31


Rectum VRE Culture - Final


NO VANCOMYCIN RESISTANT ENTEROCOCCUS ... Complete











Current Medications








 Medications


  (Trade)  Dose


 Ordered  Sig/Eri


 Route


 PRN Reason  Start Time


 Stop Time Status Last Admin


Dose Admin


 


 Acetaminophen


  (Tylenol)  650 mg  Q6H  PRN


 ORAL


 Mild Pain/Temp > 100.5  12/5/18 14:15


 1/4/19 14:14  12/5/18 15:11


 


 


 Aspirin


  (Ecotrin)  81 mg  DAILY


 ORAL


   12/4/18 09:00


 1/3/19 08:59  12/6/18 09:06


 


 


 Atorvastatin


 Calcium


  (Lipitor)  10 mg  QHS


 ORAL


   12/5/18 21:00


 1/2/19 20:59  12/5/18 21:11


 


 


 Bisacodyl


  (Dulcolax)  5 mg  DAILYPRN  PRN


 ORAL


 Constipation  12/3/18 19:00


 1/2/19 18:59   


 


 


 Ceftriaxone


 Sodium 1 gm/


 Dextrose  55 ml @ 


 110 mls/hr  Q24H


 IVPB


   12/4/18 13:00


 12/11/18 12:59  12/5/18 13:53


 


 


 Dextrose


  (Dextrose 50%)  25 ml  Q30M  PRN


 IV


 Hypoglycemia  12/4/18 07:45


 1/3/19 07:44   


 


 


 Dextrose


  (Dextrose 50%)  50 ml  Q30M  PRN


 IV


 Hypoglycemia  12/4/18 07:45


 1/3/19 07:44   


 


 


 Docusate Sodium


  (Colace)  100 mg  TID


 ORAL


   12/5/18 18:00


 1/2/19 20:59  12/6/18 09:05


 


 


 Donepezil HCl


  (Aricept)  10 mg  QHS


 ORAL


   12/3/18 21:00


 1/2/19 20:59  12/5/18 21:11


 


 


 Duloxetine HCl


  (Cymbalta)  80 mg  DAILY


 ORAL


   12/4/18 09:00


 1/3/19 08:59  12/6/18 09:06


 


 


 Furosemide


  (Lasix)  10 mg  EVERY 6  HOURS


 IV


   12/5/18 18:00


 1/4/19 17:59  12/6/18 05:35


 


 


 Gadobutrol


  (Gadavist)  7.5 mmol  NOW  PRN


 IV


 Radiology Procedure  12/4/18 14:00


 12/8/18 13:51   


 


 


 Heparin Sodium


  (Porcine)


  (Heparin 5000


 units/ml)  5,000 units  EVERY 12  HOURS


 SUBQ


   12/4/18 09:00


 1/3/19 08:59  12/6/18 09:07


 


 


 Insulin Aspart


  (NovoLOG)    BEFORE MEALS AND  HS


 SUBQ


   12/3/18 21:00


 1/2/19 20:59  12/6/18 05:38


 


 


 Insulin Aspart


  (NovoLOG)  6 units  NOVOTIAC


 SUBQ


   12/5/18 11:50


 1/3/19 11:49  12/6/18 05:38


 


 


 Insulin Detemir


  (Levemir)  18 units  DAILY


 SUBQ


   12/5/18 09:00


 1/3/19 08:59  12/6/18 09:08


 


 


 Lisinopril


  (Zestril)  5 mg  DAILY


 ORAL


   12/6/18 09:00


 1/4/19 08:59   


 


 


 Magnesium


 Hydroxide


  (Mom)  30 ml  Q4H  PRN


 ORAL


 Constipation  12/3/18 20:30


 1/2/19 18:59   


 


 


 Metformin HCl


  (Glucophage)  500 mg  TIAC


 ORAL


   12/5/18 11:30


 1/4/19 11:29  12/6/18 11:53


 


 


 Metoprolol


 Tartrate


  (Lopressor)  25 mg  Q12HR


 ORAL


   12/5/18 21:00


 1/3/19 20:59  12/5/18 21:11


 


 


 Multivitamins


 Therapeutic


  (Therapeutic


 Multivitamin)  1 ea  DAILY


 ORAL


   12/4/18 09:00


 1/3/19 08:59  12/6/18 09:06


 


 


 Pantoprazole


  (Protonix)  40 mg  DAILY


 ORAL


   12/4/18 09:00


 1/3/19 08:59  12/6/18 09:06


 


 


 Sennosides


  (Senokot)  8.6 mg  DAILY


 ORAL


   12/4/18 09:00


 1/2/19 18:59  12/6/18 09:06


 


 


 Tamsulosin HCl


  (Flomax)  0.4 mg  QHS


 ORAL


   12/4/18 21:00


 1/3/19 08:59  12/5/18 21:11


 

















Florin Bowles MD Dec 6, 2018 12:09

## 2018-12-06 NOTE — GENERAL PROGRESS NOTE
Assessment/Plan


Problem List:  


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


Status:  stable, progressing


Assessment/Plan


low dose zyprexa 2.5 mg qhs


ativan prn


the pt lacks capacity to make decisions.





Subjective


Neurologic/Psychiatric:  Reports: anxiety, depressed


Allergies:  


Coded Allergies:  


     No Known Allergies (Unverified , 9/5/16)


Subjective


the pt is confused and has waxing and waning of consciousness





Objective





Last 24 Hour Vital Signs








  Date Time  Temp Pulse Resp B/P (MAP) Pulse Ox O2 Delivery O2 Flow Rate FiO2


 


12/6/18 20:35  85  122/84    


 


12/6/18 20:00 98.7 82 18 126/81 (96) 97   


 


12/6/18 16:04 98.1 94 18 117/78 (91)    


 


12/6/18 12:00 97.6 87 18 107/71 (83) 94   


 


12/6/18 09:00      Room Air  


 


12/6/18 08:58  90  106/81    


 


12/6/18 08:58    106/81    


 


12/6/18 08:57  90  106/81 (89)    


 


12/6/18 08:00 98.2 82 18 106/72 (83) 93   


 


12/6/18 04:00 97.6 72 16 104/57 (73) 94   


 


12/6/18 00:00 97.7 88 16 117/76 (90) 95   

















Intake and Output  


 


 12/5/18 12/6/18





 19:00 07:00


 


Intake Total 545 ml 


 


Output Total 250 ml 600 ml


 


Balance 295 ml -600 ml


 


  


 


Intake Oral 545 ml 


 


Output Urine Total 250 ml 600 ml


 


# Voids 2 








Height (Feet):  5


Height (Inches):  6.00


Weight (Pounds):  151


General Appearance:  no apparent distress, alert, confused











Humberto Coronel MD Dec 6, 2018 23:18

## 2018-12-06 NOTE — NEPHROLOGY PROGRESS NOTE
Assessment/Plan


Problem List:  


(1) Diabetes mellitus out of control


(2) Leukocytosis


(3) Sepsis


Assessment


DM OOC


HypoNatremia


HTN


CVA


SEPSIS


Plan





no labs today


Antibiotics


BS control


BP control


Monitor renal parameters


trial 3% saline and lasix given 12/5





Subjective


ROS Limited/Unobtainable:  No


Constitutional:  Reports: malaise





Objective


Objective





Last 24 Hour Vital Signs








  Date Time  Temp Pulse Resp B/P (MAP) Pulse Ox O2 Delivery O2 Flow Rate FiO2


 


12/6/18 09:00      Room Air  


 


12/6/18 08:58  90  106/81    


 


12/6/18 08:58    106/81    


 


12/6/18 08:57  90  106/81 (89)    


 


12/6/18 08:00 98.2 82 18 106/72 (83) 93   


 


12/6/18 04:00 97.6 72 16 104/57 (73) 94   


 


12/6/18 00:00 97.7 88 16 117/76 (90) 95   


 


12/5/18 21:11  104  114/83    


 


12/5/18 21:00      Room Air  


 


12/5/18 20:00 99.2 104 18 114/83 (93) 96   


 


12/5/18 16:32 98.3 114 20 138/90 (106) 95   


 


12/5/18 15:41 98.3       


 


12/5/18 14:15 100.8 117 18 111/88 (96) 92   

















Intake and Output  


 


 12/5/18 12/6/18





 19:00 07:00


 


Intake Total 545 ml 


 


Output Total 250 ml 600 ml


 


Balance 295 ml -600 ml


 


  


 


Intake Oral 545 ml 


 


Output Urine Total 250 ml 600 ml


 


# Voids 2 








Height (Feet):  5


Height (Inches):  6.00


Weight (Pounds):  151


General Appearance:  no apparent distress


Objective


no change











Kapil Chu MD Dec 6, 2018 12:40

## 2018-12-07 VITALS — SYSTOLIC BLOOD PRESSURE: 110 MMHG | DIASTOLIC BLOOD PRESSURE: 75 MMHG

## 2018-12-07 VITALS — SYSTOLIC BLOOD PRESSURE: 109 MMHG | DIASTOLIC BLOOD PRESSURE: 73 MMHG

## 2018-12-07 VITALS — DIASTOLIC BLOOD PRESSURE: 77 MMHG | SYSTOLIC BLOOD PRESSURE: 117 MMHG

## 2018-12-07 VITALS — DIASTOLIC BLOOD PRESSURE: 74 MMHG | SYSTOLIC BLOOD PRESSURE: 117 MMHG

## 2018-12-07 LAB
ADD MANUAL DIFF: NO
ALBUMIN SERPL-MCNC: 2.6 G/DL (ref 3.4–5)
ALP SERPL-CCNC: 95 U/L (ref 46–116)
ALT SERPL-CCNC: 30 U/L (ref 12–78)
ANION GAP SERPL CALC-SCNC: 7 MMOL/L (ref 5–15)
AST SERPL-CCNC: 24 U/L (ref 15–37)
BASOPHILS NFR BLD AUTO: 0.4 % (ref 0–2)
BILIRUB DIRECT SERPL-MCNC: 0.2 MG/DL (ref 0–0.3)
BILIRUB SERPL-MCNC: 0.5 MG/DL (ref 0.2–1)
BUN SERPL-MCNC: 18 MG/DL (ref 7–18)
CALCIUM SERPL-MCNC: 8.3 MG/DL (ref 8.5–10.1)
CHLORIDE SERPL-SCNC: 103 MMOL/L (ref 98–107)
CO2 SERPL-SCNC: 29 MMOL/L (ref 21–32)
CREAT SERPL-MCNC: 0.8 MG/DL (ref 0.55–1.3)
EOSINOPHIL NFR BLD AUTO: 0.8 % (ref 0–3)
ERYTHROCYTE [DISTWIDTH] IN BLOOD BY AUTOMATED COUNT: 12.3 % (ref 11.6–14.8)
HCT VFR BLD CALC: 33.3 % (ref 42–52)
HGB BLD-MCNC: 10.6 G/DL (ref 14.2–18)
LYMPHOCYTES NFR BLD AUTO: 19.3 % (ref 20–45)
MCV RBC AUTO: 75 FL (ref 80–99)
MONOCYTES NFR BLD AUTO: 5.2 % (ref 1–10)
NEUTROPHILS NFR BLD AUTO: 74.4 % (ref 45–75)
PHOSPHATE SERPL-MCNC: 2.9 MG/DL (ref 2.5–4.9)
PLATELET # BLD: 230 K/UL (ref 150–450)
POTASSIUM SERPL-SCNC: 3.4 MMOL/L (ref 3.5–5.1)
RBC # BLD AUTO: 4.47 M/UL (ref 4.7–6.1)
SODIUM SERPL-SCNC: 139 MMOL/L (ref 136–145)
WBC # BLD AUTO: 10 K/UL (ref 4.8–10.8)

## 2018-12-07 RX ADMIN — MAGNESIUM OXIDE TAB 400 MG (241.3 MG ELEMENTAL MG) SCH MG: 400 (241.3 MG) TAB at 09:36

## 2018-12-07 RX ADMIN — INSULIN ASPART SCH UNITS: 100 INJECTION, SOLUTION INTRAVENOUS; SUBCUTANEOUS at 12:09

## 2018-12-07 RX ADMIN — LISINOPRIL SCH MG: 2.5 TABLET ORAL at 09:00

## 2018-12-07 RX ADMIN — HEPARIN SODIUM SCH UNITS: 5000 INJECTION INTRAVENOUS; SUBCUTANEOUS at 09:37

## 2018-12-07 RX ADMIN — ASPIRIN SCH MG: 81 TABLET, DELAYED RELEASE ORAL at 09:00

## 2018-12-07 RX ADMIN — MAGNESIUM OXIDE TAB 400 MG (241.3 MG ELEMENTAL MG) SCH MG: 400 (241.3 MG) TAB at 12:07

## 2018-12-07 RX ADMIN — INSULIN ASPART SCH UNITS: 100 INJECTION, SOLUTION INTRAVENOUS; SUBCUTANEOUS at 12:10

## 2018-12-07 RX ADMIN — INSULIN ASPART SCH UNITS: 100 INJECTION, SOLUTION INTRAVENOUS; SUBCUTANEOUS at 06:53

## 2018-12-07 RX ADMIN — DOCUSATE SODIUM SCH MG: 100 CAPSULE, LIQUID FILLED ORAL at 09:00

## 2018-12-07 RX ADMIN — Medication SCH MG: at 09:00

## 2018-12-07 RX ADMIN — METOPROLOL TARTRATE SCH MG: 25 TABLET, FILM COATED ORAL at 09:00

## 2018-12-07 RX ADMIN — Medication SCH EA: at 09:34

## 2018-12-07 RX ADMIN — MAGNESIUM OXIDE TAB 400 MG (241.3 MG ELEMENTAL MG) SCH MG: 400 (241.3 MG) TAB at 09:00

## 2018-12-07 RX ADMIN — INSULIN DETEMIR SCH UNITS: 100 INJECTION, SOLUTION SUBCUTANEOUS at 09:00

## 2018-12-07 RX ADMIN — INSULIN DETEMIR SCH UNITS: 100 INJECTION, SOLUTION SUBCUTANEOUS at 09:38

## 2018-12-07 RX ADMIN — LISINOPRIL SCH MG: 2.5 TABLET ORAL at 09:36

## 2018-12-07 RX ADMIN — Medication SCH MG: at 09:34

## 2018-12-07 RX ADMIN — METFORMIN HYDROCHLORIDE SCH MG: 500 TABLET ORAL at 05:39

## 2018-12-07 RX ADMIN — Medication SCH EA: at 09:00

## 2018-12-07 RX ADMIN — METFORMIN HYDROCHLORIDE SCH MG: 500 TABLET ORAL at 12:07

## 2018-12-07 RX ADMIN — DOCUSATE SODIUM SCH MG: 100 CAPSULE, LIQUID FILLED ORAL at 09:34

## 2018-12-07 RX ADMIN — ASPIRIN SCH MG: 81 TABLET, DELAYED RELEASE ORAL at 09:35

## 2018-12-07 RX ADMIN — HEPARIN SODIUM SCH UNITS: 5000 INJECTION INTRAVENOUS; SUBCUTANEOUS at 09:00

## 2018-12-07 RX ADMIN — DOCUSATE SODIUM SCH MG: 100 CAPSULE, LIQUID FILLED ORAL at 12:07

## 2018-12-07 RX ADMIN — METOPROLOL TARTRATE SCH MG: 25 TABLET, FILM COATED ORAL at 09:35

## 2018-12-07 RX ADMIN — INSULIN ASPART SCH UNITS: 100 INJECTION, SOLUTION INTRAVENOUS; SUBCUTANEOUS at 06:52

## 2018-12-07 RX ADMIN — SODIUM CHLORIDE SCH MLS/HR: 0.9 INJECTION INTRAVENOUS at 12:08

## 2018-12-07 NOTE — INFECTIOUS DISEASES PROG NOTE
Assessment/Plan


Assessment/Plan


A;


Sepsis, SIRS resolved


DM with hyperglycemia


Cholelithiasis


HPN


Alzheimer disease


Fever resolved


P:


Agree with discharge





Subjective


ROS Limited/Unobtainable:  Yes


Constitutional:  Reports: no symptoms


Allergies:  


Coded Allergies:  


     No Known Allergies (Unverified , 9/5/16)





Objective


Vital Signs





Last 24 Hour Vital Signs








  Date Time  Temp Pulse Resp B/P (MAP) Pulse Ox O2 Delivery O2 Flow Rate FiO2


 


12/7/18 12:00 98.1 74 18 117/77 (90) 97   


 


12/7/18 09:00      Room Air  


 


12/7/18 08:00 97.9 80 17 117/74 (88) 93   


 


12/7/18 04:00 98.3 78 19 110/75 (87) 96   


 


12/7/18 00:00 98.9 77 18 109/73 (85) 96   


 


12/6/18 21:00      Room Air  


 


12/6/18 20:35  85  122/84    


 


12/6/18 20:00 98.7 82 18 126/81 (96) 97   


 


12/6/18 16:04 98.1 94 18 117/78 (91)    








Height (Feet):  5


Height (Inches):  6.00


Weight (Pounds):  151


General Appearance:  no acute distress


HEENT:  mucous membranes moist


Respiratory/Chest:  lungs clear


Cardiovascular:  normal rate


Abdomen:  soft, non tender


Extremities:  no edema


Neurologic/Psychiatric:  other - sleeping





Microbiology








 Date/Time


Source Procedure


Growth Status


 


 


 12/5/18 14:55


Blood Blood Culture - Preliminary


NO GROWTH AFTER 24 HOURS Resulted


 


 12/5/18 14:40


Blood Blood Culture - Preliminary


NO GROWTH AFTER 24 HOURS Resulted











Laboratory Tests








Test


  12/7/18


05:55


 


White Blood Count


  10.0 K/UL


(4.8-10.8)


 


Red Blood Count


  4.47 M/UL


(4.70-6.10)  L


 


Hemoglobin


  10.6 G/DL


(14.2-18.0)  L


 


Hematocrit


  33.3 %


(42.0-52.0)  L


 


Mean Corpuscular Volume


  75 FL (80-99)


L


 


Mean Corpuscular Hemoglobin


  23.7 PG


(27.0-31.0)  L


 


Mean Corpuscular Hemoglobin


Concent 31.8 G/DL


(32.0-36.0)  L


 


Red Cell Distribution Width


  12.3 %


(11.6-14.8)


 


Platelet Count


  230 K/UL


(150-450)


 


Mean Platelet Volume


  6.8 FL


(6.5-10.1)


 


Neutrophils (%) (Auto)


  74.4 %


(45.0-75.0)


 


Lymphocytes (%) (Auto)


  19.3 %


(20.0-45.0)  L


 


Monocytes (%) (Auto)


  5.2 %


(1.0-10.0)


 


Eosinophils (%) (Auto)


  0.8 %


(0.0-3.0)


 


Basophils (%) (Auto)


  0.4 %


(0.0-2.0)


 


Sodium Level


  139 MMOL/L


(136-145)


 


Potassium Level


  3.4 MMOL/L


(3.5-5.1)  L


 


Chloride Level


  103 MMOL/L


()


 


Carbon Dioxide Level


  29 MMOL/L


(21-32)


 


Anion Gap


  7 mmol/L


(5-15)


 


Blood Urea Nitrogen


  18 mg/dL


(7-18)


 


Creatinine


  0.8 MG/DL


(0.55-1.30)


 


Estimat Glomerular Filtration


Rate > 60 mL/min


(>60)


 


Glucose Level


  126 MG/DL


()  H


 


Uric Acid


  3.9 MG/DL


(2.6-7.2)


 


Calcium Level


  8.3 MG/DL


(8.5-10.1)  L


 


Phosphorus Level


  2.9 MG/DL


(2.5-4.9)


 


Magnesium Level


  1.6 MG/DL


(1.8-2.4)  L


 


Total Bilirubin


  0.5 MG/DL


(0.2-1.0)


 


Direct Bilirubin


  0.2 MG/DL


(0.0-0.3)


 


Aspartate Amino Transf


(AST/SGOT) 24 U/L (15-37)


 


 


Alanine Aminotransferase


(ALT/SGPT) 30 U/L (12-78)


 


 


Alkaline Phosphatase


  95 U/L


()


 


Total Protein


  7.3 G/DL


(6.4-8.2)


 


Albumin


  2.6 G/DL


(3.4-5.0)  L











Current Medications








 Medications


  (Trade)  Dose


 Ordered  Sig/Eri


 Route


 PRN Reason  Start Time


 Stop Time Status Last Admin


Dose Admin


 


 Acetaminophen


  (Tylenol)  650 mg  Q6H  PRN


 ORAL


 Mild Pain/Temp > 100.5  12/5/18 14:15


 1/4/19 14:14  12/5/18 15:11


 


 


 Aspirin


  (Ecotrin)  81 mg  DAILY


 ORAL


   12/4/18 09:00


 1/3/19 08:59  12/6/18 09:06


 


 


 Atorvastatin


 Calcium


  (Lipitor)  10 mg  QHS


 ORAL


   12/5/18 21:00


 1/2/19 20:59  12/6/18 20:35


 


 


 Bisacodyl


  (Dulcolax)  5 mg  DAILYPRN  PRN


 ORAL


 Constipation  12/3/18 19:00


 1/2/19 18:59   


 


 


 Ceftriaxone


 Sodium 1 gm/


 Dextrose  55 ml @ 


 110 mls/hr  Q24H


 IVPB


   12/4/18 13:00


 12/11/18 12:59  12/7/18 12:08


 


 


 Dextrose


  (Dextrose 50%)  25 ml  Q30M  PRN


 IV


 Hypoglycemia  12/4/18 07:45


 1/3/19 07:44   


 


 


 Dextrose


  (Dextrose 50%)  50 ml  Q30M  PRN


 IV


 Hypoglycemia  12/4/18 07:45


 1/3/19 07:44   


 


 


 Docusate Sodium


  (Colace)  100 mg  TID


 ORAL


   12/5/18 18:00


 1/2/19 20:59  12/7/18 12:07


 


 


 Donepezil HCl


  (Aricept)  10 mg  QHS


 ORAL


   12/3/18 21:00


 1/2/19 20:59  12/6/18 20:32


 


 


 Duloxetine HCl


  (Cymbalta)  80 mg  DAILY


 ORAL


   12/4/18 09:00


 1/3/19 08:59  12/6/18 09:06


 


 


 Gadobutrol


  (Gadavist)  7.5 mmol  NOW  PRN


 IV


 Radiology Procedure  12/4/18 14:00


 12/8/18 13:51   


 


 


 Heparin Sodium


  (Porcine)


  (Heparin 5000


 units/ml)  5,000 units  EVERY 12  HOURS


 SUBQ


   12/4/18 09:00


 1/3/19 08:59  12/6/18 20:37


 


 


 Insulin Aspart


  (NovoLOG)    BEFORE MEALS AND  HS


 SUBQ


   12/3/18 21:00


 1/2/19 20:59  12/7/18 12:10


 


 


 Insulin Aspart


  (NovoLOG)  6 units  NOVOTIAC


 SUBQ


   12/5/18 11:50


 1/3/19 11:49  12/7/18 12:09


 


 


 Insulin Detemir


  (Levemir)  18 units  DAILY


 SUBQ


   12/5/18 09:00


 1/3/19 08:59  12/6/18 09:08


 


 


 Lisinopril


  (Zestril)  5 mg  DAILY


 ORAL


   12/6/18 09:00


 1/4/19 08:59   


 


 


 Magnesium


 Hydroxide


  (Mom)  30 ml  Q4H  PRN


 ORAL


 Constipation  12/3/18 20:30


 1/2/19 18:59   


 


 


 Magnesium Oxide


  (Mag-Ox 400mg)  400 mg  THREE TIMES A  DAY


 ORAL


   12/7/18 09:00


 1/6/19 08:59  12/7/18 12:07


 


 


 Metformin HCl


  (Glucophage)  500 mg  TIAC


 ORAL


   12/5/18 11:30


 1/4/19 11:29  12/7/18 12:07


 


 


 Metoprolol


 Tartrate


  (Lopressor)  25 mg  Q12HR


 ORAL


   12/5/18 21:00


 1/3/19 20:59  12/6/18 20:35


 


 


 Multivitamins


 Therapeutic


  (Therapeutic


 Multivitamin)  1 ea  DAILY


 ORAL


   12/4/18 09:00


 1/3/19 08:59  12/6/18 09:06


 


 


 Pantoprazole


  (Protonix)  40 mg  DAILY


 ORAL


   12/4/18 09:00


 1/3/19 08:59  12/6/18 09:06


 


 


 Sennosides


  (Senokot)  8.6 mg  DAILY


 ORAL


   12/4/18 09:00


 1/2/19 18:59  12/6/18 09:06


 


 


 Tamsulosin HCl


  (Flomax)  0.4 mg  QHS


 ORAL


   12/4/18 21:00


 1/3/19 08:59  12/6/18 20:32


 

















Florin Bowles MD Dec 7, 2018 14:42

## 2018-12-07 NOTE — GI PROGRESS NOTE
Assessment/Plan


Problems:  


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


(2) Liver lesion


ICD Codes:  K76.9 - Liver disease, unspecified


SNOMED:  780072803


(3) Peptic ulcer disease


ICD Codes:  K27.9 - Peptic ulcer, site unspecified, unspecified as acute or 

chronic, without hemorrhage or perforation


SNOMED:  03436663


(4) hyper glycemia


(5) Cholelithiasis


ICD Codes:  K80.20 - Calculus of gallbladder without cholecystitis without 

obstruction


SNOMED:  031415121


(6) Coffee ground emesis


ICD Codes:  K92.0 - Hematemesis


SNOMED:  61033552


Status:  stable


Status Narrative


Discussed with Dr. Anne.


Assessment/Plan


recent EGD/colonoscopy this year March 2018.


1. A 3 to 4 cm hiatal hernia.


2. Lower esophageal reflux erosions consistent with gastroesophageal reflux.


3. Normal colonoscopy including 10 cm of terminal ileum, although visualization 

was somewhat suboptimal.


history of iron deficiency


MRI reviewed, the prior liver lesion not seen


AFP negative





recent GI procedures, so no plans for this admission >> okay for DC per GI 

standpoint


DM management


advance diet to ADA diet


ppi


zofran prn


fu labs





The patient was seen and examined at bedside and all new and available data was 

reviewed in the patients chart. I agree with the above findings, impression 

and plan.  (Patient seen earlier today. Signature stamp does not reflect 

patient encounter time.). - Mikel Anne MD





Subjective


Subjective


refusing am medication





Objective





Last 24 Hour Vital Signs








  Date Time  Temp Pulse Resp B/P (MAP) Pulse Ox O2 Delivery O2 Flow Rate FiO2


 


12/7/18 09:00      Room Air  


 


12/7/18 08:00 97.9 80 17 117/74 (88) 93   


 


12/7/18 04:00 98.3 78 19 110/75 (87) 96   


 


12/7/18 00:00 98.9 77 18 109/73 (85) 96   


 


12/6/18 21:00      Room Air  


 


12/6/18 20:35  85  122/84    


 


12/6/18 20:00 98.7 82 18 126/81 (96) 97   


 


12/6/18 16:04 98.1 94 18 117/78 (91)    


 


12/6/18 12:00 97.6 87 18 107/71 (83) 94   

















Intake and Output  


 


 12/6/18 12/7/18





 18:59 06:59


 


Intake Total 560 ml 420 ml


 


Balance 560 ml 420 ml


 


  


 


Intake Oral 560 ml 420 ml


 


# Voids 2 3











Laboratory Tests








Test


  12/7/18


05:55


 


White Blood Count


  10.0 K/UL


(4.8-10.8)


 


Red Blood Count


  4.47 M/UL


(4.70-6.10)  L


 


Hemoglobin


  10.6 G/DL


(14.2-18.0)  L


 


Hematocrit


  33.3 %


(42.0-52.0)  L


 


Mean Corpuscular Volume


  75 FL (80-99)


L


 


Mean Corpuscular Hemoglobin


  23.7 PG


(27.0-31.0)  L


 


Mean Corpuscular Hemoglobin


Concent 31.8 G/DL


(32.0-36.0)  L


 


Red Cell Distribution Width


  12.3 %


(11.6-14.8)


 


Platelet Count


  230 K/UL


(150-450)


 


Mean Platelet Volume


  6.8 FL


(6.5-10.1)


 


Neutrophils (%) (Auto)


  74.4 %


(45.0-75.0)


 


Lymphocytes (%) (Auto)


  19.3 %


(20.0-45.0)  L


 


Monocytes (%) (Auto)


  5.2 %


(1.0-10.0)


 


Eosinophils (%) (Auto)


  0.8 %


(0.0-3.0)


 


Basophils (%) (Auto)


  0.4 %


(0.0-2.0)


 


Sodium Level


  139 MMOL/L


(136-145)


 


Potassium Level


  3.4 MMOL/L


(3.5-5.1)  L


 


Chloride Level


  103 MMOL/L


()


 


Carbon Dioxide Level


  29 MMOL/L


(21-32)


 


Anion Gap


  7 mmol/L


(5-15)


 


Blood Urea Nitrogen


  18 mg/dL


(7-18)


 


Creatinine


  0.8 MG/DL


(0.55-1.30)


 


Estimat Glomerular Filtration


Rate > 60 mL/min


(>60)


 


Glucose Level


  126 MG/DL


()  H


 


Uric Acid


  3.9 MG/DL


(2.6-7.2)


 


Calcium Level


  8.3 MG/DL


(8.5-10.1)  L


 


Phosphorus Level


  2.9 MG/DL


(2.5-4.9)


 


Magnesium Level


  1.6 MG/DL


(1.8-2.4)  L


 


Total Bilirubin


  0.5 MG/DL


(0.2-1.0)


 


Direct Bilirubin


  0.2 MG/DL


(0.0-0.3)


 


Aspartate Amino Transf


(AST/SGOT) 24 U/L (15-37)


 


 


Alanine Aminotransferase


(ALT/SGPT) 30 U/L (12-78)


 


 


Alkaline Phosphatase


  95 U/L


()


 


Total Protein


  7.3 G/DL


(6.4-8.2)


 


Albumin


  2.6 G/DL


(3.4-5.0)  L








Height (Feet):  5


Height (Inches):  6.00


Weight (Pounds):  151


General Appearance:  WD/WN, no apparent distress, alert


Cardiovascular:  normal rate


Respiratory/Chest:  normal breath sounds, no respiratory distress


Abdominal Exam:  normal bowel sounds, non tender, soft


Extremities:  normal range of motion, non-tender











Yocasta Richards NP Dec 7, 2018 10:38

## 2018-12-07 NOTE — GENERAL PROGRESS NOTE
Assessment/Plan


Assessment/Plan


1. Leukocytosis secondary to sepsis. ID is following, appreciate recs. 


--> Closely monitor and trend wbc for improvement.  


--> Peripheral has been ordered, is within normal limits


--> Medications have been reviewed 


--> Imaging has been reviewed, reveals cholelithiasis


--> Blood cultures and urine cultures reviewed 12/5 ngtd


--> Pt on IV abx. 


2. Anemia due to underlying chronic disease. Current Hgb goal >10, no w/u 

required at this time. 


--> Continue to closely monitor for stability. 


--> if less than 10, consider w/u


3. Hyperglycemia, endocrinology is following, appreciate recs.


--> The patient started on Levemir as well as NovoLog 5 units before each meal 

and metformin as well and insulin sliding scale.


--> A1c was checked and it was 9.7. Therefore, poorly controlled.


4. Abdominal pain.  Imaging has been reviewed.  No evidence of infectious 

etiology at this time.


5. Hypertension. 


6. Recent EGD and colonoscopy in March 2018.  Some erosions noted.





GREATLY APPRECIATE CONSULTATION.





Subjective


Constitutional:  Denies: no symptoms, chills, diaphoresis, fever, malaise, 

weakness, other


HEENT:  Denies: no symptoms, eye pain, blurred vision, tearing, double vision, 

ear pain, ear discharge, nose pain, nose congestion, throat pain, throat 

swelling, mouth pain, mouth swelling, other


Cardiovascular:  Denies: no symptoms, chest pain, edema, irregular heart rate, 

lightheadedness, palpitations, syncope, other


Respiratory:  Denies: no symptoms, cough, orthopnea, shortness of breath, SOB 

with excertion, SOB at rest, sputum, stridor, wheezing, other


Gastrointestinal/Abdominal:  Denies: no symptoms, abdomen distended, abdominal 

pain, black stools, tarry stools, blood in stool, constipated, diarrhea, 

difficulty swallowing, nausea, poor appetite, poor fluid intake, rectal bleeding

, vomiting, other


Genitourinary:  Denies: no symptoms, burning, discharge, frequency, flank pain, 

hematuria, incontinence, pain, urgency, other


Neurologic/Psychiatric:  Denies: no symptoms, anxiety, depressed, emotional 

problems, headache, numbness, paresthesia, pre-existing deficit, seizure, 

tingling, tremors, weakness, other


Allergies:  


Coded Allergies:  


     No Known Allergies (Unverified , 9/5/16)


Subjective


Pt awake and alert. No acute events. WBC is improved, on abx. was given lasix





Objective





Last 24 Hour Vital Signs








  Date Time  Temp Pulse Resp B/P (MAP) Pulse Ox O2 Delivery O2 Flow Rate FiO2


 


12/7/18 04:00 98.3 78 19 110/75 (87) 96   


 


12/7/18 00:00 98.9 77 18 109/73 (85) 96   


 


12/6/18 21:00      Room Air  


 


12/6/18 20:35  85  122/84    


 


12/6/18 20:00 98.7 82 18 126/81 (96) 97   


 


12/6/18 16:04 98.1 94 18 117/78 (91)    


 


12/6/18 12:00 97.6 87 18 107/71 (83) 94   


 


12/6/18 09:00      Room Air  


 


12/6/18 08:58  90  106/81    


 


12/6/18 08:58    106/81    


 


12/6/18 08:57  90  106/81 (89)    


 


12/6/18 08:00 98.2 82 18 106/72 (83) 93   

















Intake and Output  


 


 12/6/18 12/7/18





 19:00 07:00


 


Intake Total 560 ml 420 ml


 


Balance 560 ml 420 ml


 


  


 


Intake Oral 560 ml 420 ml


 


# Voids 2 3








Laboratory Tests


12/7/18 05:55: 


White Blood Count 10.0, Red Blood Count 4.47L, Hemoglobin 10.6L, Hematocrit 

33.3L, Mean Corpuscular Volume 75L, Mean Corpuscular Hemoglobin 23.7L, Mean 

Corpuscular Hemoglobin Concent 31.8L, Red Cell Distribution Width 12.3, 

Platelet Count 230, Mean Platelet Volume 6.8, Neutrophils (%) (Auto) 74.4, 

Lymphocytes (%) (Auto) 19.3L, Monocytes (%) (Auto) 5.2, Eosinophils (%) (Auto) 

0.8, Basophils (%) (Auto) 0.4, Sodium Level 139, Potassium Level 3.4L, Chloride 

Level 103, Carbon Dioxide Level 29, Anion Gap 7, Blood Urea Nitrogen 18, 

Creatinine 0.8, Estimat Glomerular Filtration Rate > 60, Glucose Level 126H, 

Uric Acid [Pending], Calcium Level 8.3L, Phosphorus Level [Pending], Magnesium 

Level [Pending], Total Bilirubin [Pending], Direct Bilirubin [Pending], 

Aspartate Amino Transf (AST/SGOT) [Pending], Alanine Aminotransferase (ALT/SGPT

) [Pending], Alkaline Phosphatase [Pending], Total Protein [Pending], Albumin [

Pending]


Height (Feet):  5


Height (Inches):  6.00


Weight (Pounds):  151


Objective


General Appearance:  well appearing, no apparent distress, ++ lethargic


Head:  normocephalic


EENT:  PERRL/EOMI, normal ENT inspection


Neck:  supple


Respiratory:  normal breath sounds, no respiratory distress


Cardiovascular:  normal rate


Gastrointestinal:  normal inspection, non tender, soft, normal bowel sounds, non

-distended


Genitourinary:  deferred


Musculoskeletal:  normal inspection, back normal


Neurologic:  alert, responsive


Psychiatric:  normal inspection, judgement/insight normal, memory normal


Skin:  normal inspection, normal color, no rash, warm/dry, palpation normal, 

well hydrated


Lymphatic:  normal inspection, no adenopathy











Philippe Laws MD Dec 7, 2018 06:53

## 2018-12-07 NOTE — GENERAL PROGRESS NOTE
Assessment/Plan


Problem List:  


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


Status:  stable, progressing


Assessment/Plan


low dose zyprexa 2.5 mg qhs


ativan prn


the pt lacks capacity to make decisions.





Subjective


Neurologic/Psychiatric:  Reports: anxiety


Allergies:  


Coded Allergies:  


     No Known Allergies (Unverified , 9/5/16)


Subjective


the pt is less confused and more alert





Objective





Last 24 Hour Vital Signs








  Date Time  Temp Pulse Resp B/P (MAP) Pulse Ox O2 Delivery O2 Flow Rate FiO2


 


12/7/18 12:00 98.1 74 18 117/77 (90) 97   


 


12/7/18 09:00      Room Air  


 


12/7/18 08:00 97.9 80 17 117/74 (88) 93   


 


12/7/18 04:00 98.3 78 19 110/75 (87) 96   


 


12/7/18 00:00 98.9 77 18 109/73 (85) 96   


 


12/6/18 21:00      Room Air  


 


12/6/18 20:35  85  122/84    


 


12/6/18 20:00 98.7 82 18 126/81 (96) 97   


 


12/6/18 16:04 98.1 94 18 117/78 (91)    

















Intake and Output  


 


 12/6/18 12/7/18





 18:59 06:59


 


Intake Total 560 ml 420 ml


 


Balance 560 ml 420 ml


 


  


 


Intake Oral 560 ml 420 ml


 


# Voids 2 3








Laboratory Tests


12/7/18 05:55: 


White Blood Count 10.0, Red Blood Count 4.47L, Hemoglobin 10.6L, Hematocrit 

33.3L, Mean Corpuscular Volume 75L, Mean Corpuscular Hemoglobin 23.7L, Mean 

Corpuscular Hemoglobin Concent 31.8L, Red Cell Distribution Width 12.3, 

Platelet Count 230, Mean Platelet Volume 6.8, Neutrophils (%) (Auto) 74.4, 

Lymphocytes (%) (Auto) 19.3L, Monocytes (%) (Auto) 5.2, Eosinophils (%) (Auto) 

0.8, Basophils (%) (Auto) 0.4, Sodium Level 139, Potassium Level 3.4L, Chloride 

Level 103, Carbon Dioxide Level 29, Anion Gap 7, Blood Urea Nitrogen 18, 

Creatinine 0.8, Estimat Glomerular Filtration Rate > 60, Glucose Level 126H, 

Uric Acid 3.9, Calcium Level 8.3L, Phosphorus Level 2.9, Magnesium Level 1.6L, 

Total Bilirubin 0.5, Direct Bilirubin 0.2, Aspartate Amino Transf (AST/SGOT) 24

, Alanine Aminotransferase (ALT/SGPT) 30, Alkaline Phosphatase 95, Total 

Protein 7.3, Albumin 2.6L


Height (Feet):  5


Height (Inches):  6.00


Weight (Pounds):  151


General Appearance:  no apparent distress, alert, confused


Neurologic:  responsive, depressed affect











Humberto Coronel MD Dec 7, 2018 12:44

## 2018-12-07 NOTE — NEPHROLOGY PROGRESS NOTE
Assessment/Plan


Problem List:  


(1) Hyponatremia


(2) Diabetes mellitus out of control


(3) Leukocytosis


(4) Sepsis


Assessment


refuses meds !





DM OOC


HypoNatremia resolved


HTN


CVA


SEPSIS


Plan





Po KCL 


Antibiotics


BS control


BP control


Monitor renal parameters


trial 3% saline and lasix given 12/5





Subjective


ROS Limited/Unobtainable:  No


Constitutional:  Reports: malaise





Objective


Objective





Last 24 Hour Vital Signs








  Date Time  Temp Pulse Resp B/P (MAP) Pulse Ox O2 Delivery O2 Flow Rate FiO2


 


12/7/18 09:00      Room Air  


 


12/7/18 08:00 97.9 80 17 117/74 (88) 93   


 


12/7/18 04:00 98.3 78 19 110/75 (87) 96   


 


12/7/18 00:00 98.9 77 18 109/73 (85) 96   


 


12/6/18 21:00      Room Air  


 


12/6/18 20:35  85  122/84    


 


12/6/18 20:00 98.7 82 18 126/81 (96) 97   


 


12/6/18 16:04 98.1 94 18 117/78 (91)    


 


12/6/18 12:00 97.6 87 18 107/71 (83) 94   

















Intake and Output  


 


 12/6/18 12/7/18





 18:59 06:59


 


Intake Total 560 ml 420 ml


 


Balance 560 ml 420 ml


 


  


 


Intake Oral 560 ml 420 ml


 


# Voids 2 3








Laboratory Tests


12/7/18 05:55: 


White Blood Count 10.0, Red Blood Count 4.47L, Hemoglobin 10.6L, Hematocrit 

33.3L, Mean Corpuscular Volume 75L, Mean Corpuscular Hemoglobin 23.7L, Mean 

Corpuscular Hemoglobin Concent 31.8L, Red Cell Distribution Width 12.3, 

Platelet Count 230, Mean Platelet Volume 6.8, Neutrophils (%) (Auto) 74.4, 

Lymphocytes (%) (Auto) 19.3L, Monocytes (%) (Auto) 5.2, Eosinophils (%) (Auto) 

0.8, Basophils (%) (Auto) 0.4, Sodium Level 139, Potassium Level 3.4L, Chloride 

Level 103, Carbon Dioxide Level 29, Anion Gap 7, Blood Urea Nitrogen 18, 

Creatinine 0.8, Estimat Glomerular Filtration Rate > 60, Glucose Level 126H, 

Uric Acid 3.9, Calcium Level 8.3L, Phosphorus Level 2.9, Magnesium Level 1.6L, 

Total Bilirubin 0.5, Direct Bilirubin 0.2, Aspartate Amino Transf (AST/SGOT) 24

, Alanine Aminotransferase (ALT/SGPT) 30, Alkaline Phosphatase 95, Total 

Protein 7.3, Albumin 2.6L


Height (Feet):  5


Height (Inches):  6.00


Weight (Pounds):  151


General Appearance:  no apparent distress


Objective


no change











Kapil Chu MD Dec 7, 2018 10:53

## 2018-12-10 NOTE — DISCHARGE SUMMARY
Discharge Summary


Discharge Summary


_


DATE OF ADMISSION: 12/03/2018





DATE OF DISCHARGE: 12/07/2018 





REASON FOR ADMISSION: 


68 years old male with past medical history of diabetes mellitus, hypertension, 

CVA/TIA, resident of skilled nursing facility, presented to emergency room for 

evaluation due to elevated blood sugar.  


Patient nonverbal at baseline.   Patient   was pointed to the right lower 

quadrant , probably indicating pain.  


Per SNF report, no fevers, no chills, no chest pain or shortness of breath.


Upon evaluation vital signs revealed no fever,  patient was tachycardic.


Laboratory workup revealed leukocytosis ,  hemoglobin 14.0, hematocrit 41.8.  

MCV 74.


Chemistry revealed  magnesium 1.3, stable other  electrolytes and renal 

parameters.  Stable LFT.  Lipase 244.  


Glucose 360.  Anion gap   and CO2 within normal limits.  


Urinalysis no evidence of UTI.


CT of the abdomen and pelvis revealed no gross acute abnormality.  

Cholelithiasis.  Mild fatty  fever.  Mild prostatomegaly.


Patient admitted with diagnoses of hyperglycemia with   diabetes mellitus out 

of control, abdominal pain, probably sepsis.





CONSULTANTS:


ID specialist Dr. DINORAH Bowles


GI specialist Dr. Anne


nephrologist Dr. Chu


hematologist/oncologist Dr. Laws


psychiatrist 


endocrinologist Dr. Alvarado


 


[facility] [place] [facility]SE: 


Patient admitted and started on  IV hydration.  


Endocrinologist seen and evaluated patient and optimized anti-glycemic 

medication regimen.  


Hemoglobin A1c- 9.7 , clearly not at goal.  


Patient started on  long acting Levemir, pre-meal short-acting NovoLog, 

metformin was continued, sliding scale of insulin was  implemented as needed.  


Blood sugar stabilized.   


GI specialist closely follow.  


Patient undergone recent EGD and colonoscopy in March 2018,  which revealed 3-4 

cm hiatal hernia. 


Lower esophageal reflux erosions consistent with gastroesophageal reflux.


Normal colonoscopy including 10 cm of terminal ileum, although visualization 

was somewhat suboptimal.


No plans for GI procedure on this admission.  


Patient subsequently undergone abdominal MRI.  


Previously reported in 2016 left lobe liver lesion was no longer evident , 

possibly cyst that involuted  versus artifact.  Cholelithiasis.  No evidence of 

biliary ductal dilatation or choledocholithiasis.  


Liver enzymes were stable.


Alpha-fetoprotein within normal range.


Pain management was addressed as needed.  


Diet was advanced to ADA diet as tolerated. 


Antiemetics were on board as needed.  


Patient was continued on PPI..





Patient initially presented with tachycardia  and leukocytosis .  Patient had 

fever on December 5.


Patient was on empiric antibiotic as per ID specialist recommendations. 


Fever resolved.  


Blood culture were negative.  


No evidence of infection.  


Keep off antibiotics as per ID recommendations.





Nephrologist closely followed for hyponatremia.  


Patient undergone trial of 3% saline and Lasix.  Hyponatremia resolved.  Prior 

to discharge sodium 139. 


Blood pressure was managed with ACE inhibitor  and beta blocker ,  remained 

stable.  


Electrolytes were closely monitored and replaced. 


Renal parameters were closely monitored and nephrotoxins were avoided.  


Patient was continued on antiplatelet therapy with aspirin and statin.  Flomax 

was continued.   





Hemoglobin dropped to   10.6.  Hematologist followed. 


According to hematologist , patient likely had anemia due to underlying chronic 

disease. No workup  was required since  hemoglobin  was above 10.


Hematologist recommended monitor hemoglobin, and if further drop below 10 , 

then  consider further workup.  





Psychiatrist followed.  


According to psychiatrist patient had encephalopathy due to toxin.  


Patient started on low-dose of Zyprexa at nighttime . 


Ativan was on board as needed.  





Patient clinically stabilized. Blood sugar stable.  No further complaints of 

abdominal pain .


Patient was stable for discharged to skilled nursing facility for continuation 

of care.  








FINAL DIAGNOSES: 


Sepsis 


Hyperglycemia 


Diabetes mellitus out of control 


Encephalopathy due to toxin 


Hyponatremia 


Cholelithiasis 


Peptic ulcer disease l


Liver lesion- no further evident  


Hypertension 


History of CVA 


Anemia of chronic disease





DISCHARGE MEDICATIONS:


See Medication Reconciliation list.





DISCHARGE INSTRUCTIONS:


Patient was discharged to the skilled nursing facility. 


Follow up with medical doctor at the facility.





I have been assigned to dictate discharge summary for this account. I was not 

involved in the patient's management.











Ruba Rizvi NP Dec 10, 2018 08:43

## 2019-11-06 ENCOUNTER — HOSPITAL ENCOUNTER (INPATIENT)
Dept: HOSPITAL 72 - EMR | Age: 69
LOS: 5 days | Discharge: NURSING, REHAB OR LONG-TERM CARE | DRG: 640 | End: 2019-11-11
Payer: MEDICARE

## 2019-11-06 VITALS — BODY MASS INDEX: 29.99 KG/M2 | WEIGHT: 180 LBS | HEIGHT: 65 IN

## 2019-11-06 VITALS — SYSTOLIC BLOOD PRESSURE: 116 MMHG | DIASTOLIC BLOOD PRESSURE: 61 MMHG

## 2019-11-06 VITALS — SYSTOLIC BLOOD PRESSURE: 103 MMHG | DIASTOLIC BLOOD PRESSURE: 72 MMHG

## 2019-11-06 VITALS — SYSTOLIC BLOOD PRESSURE: 128 MMHG | DIASTOLIC BLOOD PRESSURE: 86 MMHG

## 2019-11-06 DIAGNOSIS — E87.2: Primary | ICD-10-CM

## 2019-11-06 DIAGNOSIS — N40.0: ICD-10-CM

## 2019-11-06 DIAGNOSIS — I10: ICD-10-CM

## 2019-11-06 DIAGNOSIS — G30.9: ICD-10-CM

## 2019-11-06 DIAGNOSIS — Z86.14: ICD-10-CM

## 2019-11-06 DIAGNOSIS — E11.9: ICD-10-CM

## 2019-11-06 DIAGNOSIS — G93.41: ICD-10-CM

## 2019-11-06 DIAGNOSIS — K80.20: ICD-10-CM

## 2019-11-06 DIAGNOSIS — T38.3X5A: ICD-10-CM

## 2019-11-06 DIAGNOSIS — F02.80: ICD-10-CM

## 2019-11-06 DIAGNOSIS — E78.5: ICD-10-CM

## 2019-11-06 DIAGNOSIS — R19.7: ICD-10-CM

## 2019-11-06 DIAGNOSIS — K21.9: ICD-10-CM

## 2019-11-06 DIAGNOSIS — I69.354: ICD-10-CM

## 2019-11-06 LAB
ADD MANUAL DIFF: NO
ALBUMIN SERPL-MCNC: 3.7 G/DL (ref 3.4–5)
ALBUMIN/GLOB SERPL: 1 {RATIO} (ref 1–2.7)
ALP SERPL-CCNC: 130 U/L (ref 46–116)
ALT SERPL-CCNC: 50 U/L (ref 12–78)
ANION GAP SERPL CALC-SCNC: 11 MMOL/L (ref 5–15)
APPEARANCE UR: CLEAR
APTT PPP: YELLOW S
AST SERPL-CCNC: 25 U/L (ref 15–37)
BASOPHILS NFR BLD AUTO: 0.9 % (ref 0–2)
BILIRUB SERPL-MCNC: 0.5 MG/DL (ref 0.2–1)
BUN SERPL-MCNC: 13 MG/DL (ref 7–18)
CALCIUM SERPL-MCNC: 9 MG/DL (ref 8.5–10.1)
CHLORIDE SERPL-SCNC: 104 MMOL/L (ref 98–107)
CK MB SERPL-MCNC: 0.8 NG/ML (ref 0–3.6)
CK SERPL-CCNC: 82 U/L (ref 26–308)
CO2 SERPL-SCNC: 26 MMOL/L (ref 21–32)
CREAT SERPL-MCNC: 0.9 MG/DL (ref 0.55–1.3)
EOSINOPHIL NFR BLD AUTO: 0.6 % (ref 0–3)
ERYTHROCYTE [DISTWIDTH] IN BLOOD BY AUTOMATED COUNT: 12.5 % (ref 11.6–14.8)
GLOBULIN SER-MCNC: 3.7 G/DL
GLUCOSE UR STRIP-MCNC: NEGATIVE MG/DL
HCT VFR BLD CALC: 36.5 % (ref 42–52)
HGB BLD-MCNC: 11.7 G/DL (ref 14.2–18)
KETONES UR QL STRIP: (no result)
LEUKOCYTE ESTERASE UR QL STRIP: (no result)
LYMPHOCYTES NFR BLD AUTO: 20.2 % (ref 20–45)
MCV RBC AUTO: 73 FL (ref 80–99)
MONOCYTES NFR BLD AUTO: 5.4 % (ref 1–10)
NEUTROPHILS NFR BLD AUTO: 73 % (ref 45–75)
NITRITE UR QL STRIP: NEGATIVE
PH UR STRIP: 5 [PH] (ref 4.5–8)
PHOSPHATE SERPL-MCNC: 2.8 MG/DL (ref 2.5–4.9)
PLATELET # BLD: 288 K/UL (ref 150–450)
POTASSIUM SERPL-SCNC: 4.6 MMOL/L (ref 3.5–5.1)
PROT UR QL STRIP: (no result)
RBC # BLD AUTO: 4.97 M/UL (ref 4.7–6.1)
SODIUM SERPL-SCNC: 141 MMOL/L (ref 136–145)
SP GR UR STRIP: 1.02 (ref 1–1.03)
UROBILINOGEN UR-MCNC: 1 MG/DL (ref 0–1)
WBC # BLD AUTO: 10.4 K/UL (ref 4.8–10.8)

## 2019-11-06 PROCEDURE — 76700 US EXAM ABDOM COMPLETE: CPT

## 2019-11-06 PROCEDURE — 82550 ASSAY OF CK (CPK): CPT

## 2019-11-06 PROCEDURE — 93005 ELECTROCARDIOGRAM TRACING: CPT

## 2019-11-06 PROCEDURE — 71045 X-RAY EXAM CHEST 1 VIEW: CPT

## 2019-11-06 PROCEDURE — 84484 ASSAY OF TROPONIN QUANT: CPT

## 2019-11-06 PROCEDURE — 83735 ASSAY OF MAGNESIUM: CPT

## 2019-11-06 PROCEDURE — 80053 COMPREHEN METABOLIC PANEL: CPT

## 2019-11-06 PROCEDURE — 87324 CLOSTRIDIUM AG IA: CPT

## 2019-11-06 PROCEDURE — 96372 THER/PROPH/DIAG INJ SC/IM: CPT

## 2019-11-06 PROCEDURE — 81003 URINALYSIS AUTO W/O SCOPE: CPT

## 2019-11-06 PROCEDURE — 99285 EMERGENCY DEPT VISIT HI MDM: CPT

## 2019-11-06 PROCEDURE — 87040 BLOOD CULTURE FOR BACTERIA: CPT

## 2019-11-06 PROCEDURE — 96365 THER/PROPH/DIAG IV INF INIT: CPT

## 2019-11-06 PROCEDURE — 84100 ASSAY OF PHOSPHORUS: CPT

## 2019-11-06 PROCEDURE — 83036 HEMOGLOBIN GLYCOSYLATED A1C: CPT

## 2019-11-06 PROCEDURE — 87081 CULTURE SCREEN ONLY: CPT

## 2019-11-06 PROCEDURE — 83605 ASSAY OF LACTIC ACID: CPT

## 2019-11-06 PROCEDURE — 82962 GLUCOSE BLOOD TEST: CPT

## 2019-11-06 PROCEDURE — 82553 CREATINE MB FRACTION: CPT

## 2019-11-06 PROCEDURE — 36415 COLL VENOUS BLD VENIPUNCTURE: CPT

## 2019-11-06 PROCEDURE — 85025 COMPLETE CBC W/AUTO DIFF WBC: CPT

## 2019-11-06 PROCEDURE — 96375 TX/PRO/DX INJ NEW DRUG ADDON: CPT

## 2019-11-06 NOTE — NUR
ED Nurse Note:



Pt becoming agitated and restless, pt pulled out IV line x 2, restarted, MD 
informed, pt placed on soft restraints and will be medicated for agitation, pt 
completed ordered IV fluids and antibiotic, toelratted well, no s/s of adverse 
reaction, pt straight cath for urine sample, sent to lab, will resume care as 
ordered and prepare for admission.

## 2019-11-06 NOTE — NUR
ED Nurse Note:



Recieved pt BIBA from SNF with c/o increased AMS, pt in bed awake and alert, 
speaks only Kosovan, oriented to name and place, is currently calm and 
cooperative, pt gowned and palced on cardiac monitoring, IV libe started and 
labs drawn, will resume care as ordered and closely monitor.

## 2019-11-06 NOTE — NUR
ED Nurse Note:





noted pt with iv pulled out and iv fluids draining on the ground, noted pt 
increase in agitation such as verbally inappropriate and clenching fist towards 
nursing staff with kicking motion. ERMD notified. nonbehavioral restraints 
applied for pt's safety and continuity of care per ERMD order. skin intact, + 2 
radial pulses noted. will cont monitor. pt vss at this time.

## 2019-11-06 NOTE — NUR
ED Nurse Note:





pt currently calm and cooperative, verbalized that he will not pull out IV and 
will be compliant, restraints d/c and ermd notified, pt skin intact, +ROM, pt 
has hx left side weakness, cap refill <3sec, no injuries occured while on 
restraints.

## 2019-11-06 NOTE — EMERGENCY ROOM REPORT
History of Present Illness


General


Chief Complaint:  Altered Mental Status


Source:  Patient, Medical Record





Present Illness


HPI


Patient is a 69-year-old male sent in from facility for increased altered 

mental status.  Patient a prior history of schizophrenia as well as CVA with 

resulting left-sided weakness.  Patient reportedly had become increasingly 

confused.  He had been more agitated as well as some increased altered mental 

status.


Allergies:  


Coded Allergies:  


     No Known Allergies (Unverified , 9/5/16)





Patient History


Past Medical History:  see triage record


Reviewed Nursing Documentation:  PMH: Agreed; PSxH: Agreed





Nursing Documentation-PMH


Past Medical History:  No History, Except For


Hx Hypertension:  Yes


Hx Diabetes:  Yes


Hx Cancer:  No


Hx Neurological Problems:  Yes


Hx Cerebrovascular Accident:  Yes


Hx Transient Ischemic Attacks:  Yes


Hx Dementia:  No


Hx Alzheimer's Disease:  No


Hx Parkinson's Disease:  No


Hx Meningitis:  No


Hx Encephalitis:  No


Hx Seizures:  No


Hx Epilepsy:  No


Hx Multiple Sclerosis:  No


Hx Cerebral Palsy:  No


Hx Amyotrophic Lat Sclerosis:  No


Hx Paralysis:  Yes


Hx Traumatic Brain Injury:  Yes


Hx Weakness:  Yes


Hx Neurologic Surgery:  No


Hx Brain Shunt:  No





Review of Systems


All Other Systems:  limited - Limited by poor historian





Physical Exam





Vital Signs








  Date Time  Temp Pulse Resp B/P (MAP) Pulse Ox O2 Delivery O2 Flow Rate FiO2


 


11/6/19 18:56 98.8 98 18 104/69 (81) 93 Room Air  








General Appearance:  alert, Chronically Ill


Eyes:  bilateral eye PERRL


ENT:  hearing grossly normal


Neck:  full range of motion


Respiratory:  chest non-tender, lungs clear, normal breath sounds


Cardiovascular #1:  normal peripheral pulses


Gastrointestinal:  normal inspection


Musculoskeletal:  decreased range of motion


Neurologic:  motor weakness - left upper extremity, left facial droo


Psychiatric:  anxious


Skin:  no rash





Medical Decision Making


Diagnostic Impression:  


 Primary Impression:  


 Metabolic acidosis


 Additional Impression:  


 Encephalopathy acute


ER Course


Patient presented for increased altered mental status.  Differential diagnosis 

include was not limited to urinary tract infection, sepsis, pneumonia, hepatic 

encephalopathy among others.  Because of complexity of patient's case 

laboratory tests and imaging studies were ordered.  Patient had what appears to 

be a metabolic acidosis. Laboratory testing showed elevated alkaline 

phosphatase as well as normal initial white blood count.  This does not appear 

to be consistent with sepsis. Patient's initial lactate was noted to be 

elevated and repeat was noted to be increased. Patient was given IV fluids 

while in the emergency department. Patient had prior history of CVA with 

residual left-sided weakness.  Patient was placed in restraints due to pulling 

out IVs.  He appears to be somewhat confused.  He was given some medications 

due to agitation.  Dr. Sancho Ibarra was contacted for inpatient management due to 

encephalopathy as well as lactic acidosis.  Patient be admitted for further 

evaluation and treatment.  He was noted to be full code.





Last Vital Signs








  Date Time  Temp Pulse Resp B/P (MAP) Pulse Ox O2 Delivery O2 Flow Rate FiO2


 


11/6/19 20:00 98.8 104 18 103/72 97 Room Air  








Status:  unchanged


Disposition:  ADMITTED AS INPATIENT


Condition:  Stable


Referrals:  


Sancho Ibarra MD (PCP)











Angelo Clark MD Nov 6, 2019 20:41

## 2019-11-06 NOTE — NUR
NURSE NOTES:

Received report from ASIM Cortés RN. regarding patient's transfer to TELE floor from ED for 
AMS. Arrived via gurney and transferred to bed with assist. Continuous cardiac monitoring in 
place per protocol, belongings checked and noted, head to toe assessment initiated with no 
noted skin issues observed. IV line intact and patent SL. Kept clean, dry, and comfortable 
in bed with no complaints of acute pain or discomfort at this time. Safety precaution 
initiated; side rails X3 up, call ligth within reach, bed in lowest position, brakes and 
alarm on at all times. Needs and wants anticipated and attended.



New orders received and carried out per MD Fred.

## 2019-11-06 NOTE — NUR
TRANSFER TO FLOOR:

Patient transferred to tele floor and report was given to RN Parviz and endorsed 
care, vss, resp even and unlabored on RA, iv intact and patent, pt transferred 
on gurney on acls protocol, sinus rhythm on cardiac monitor. all belongings 
sent w/pt w/ completed list.

## 2019-11-07 VITALS — DIASTOLIC BLOOD PRESSURE: 73 MMHG | SYSTOLIC BLOOD PRESSURE: 137 MMHG

## 2019-11-07 VITALS — DIASTOLIC BLOOD PRESSURE: 69 MMHG | SYSTOLIC BLOOD PRESSURE: 111 MMHG

## 2019-11-07 VITALS — SYSTOLIC BLOOD PRESSURE: 121 MMHG | DIASTOLIC BLOOD PRESSURE: 86 MMHG

## 2019-11-07 VITALS — DIASTOLIC BLOOD PRESSURE: 79 MMHG | SYSTOLIC BLOOD PRESSURE: 123 MMHG

## 2019-11-07 VITALS — SYSTOLIC BLOOD PRESSURE: 101 MMHG | DIASTOLIC BLOOD PRESSURE: 70 MMHG

## 2019-11-07 VITALS — DIASTOLIC BLOOD PRESSURE: 63 MMHG | SYSTOLIC BLOOD PRESSURE: 105 MMHG

## 2019-11-07 LAB
ADD MANUAL DIFF: NO
ALBUMIN SERPL-MCNC: 3.6 G/DL (ref 3.4–5)
ALBUMIN/GLOB SERPL: 0.9 {RATIO} (ref 1–2.7)
ALP SERPL-CCNC: 140 U/L (ref 46–116)
ALT SERPL-CCNC: 43 U/L (ref 12–78)
ANION GAP SERPL CALC-SCNC: 9 MMOL/L (ref 5–15)
AST SERPL-CCNC: 24 U/L (ref 15–37)
BASOPHILS NFR BLD AUTO: 0.5 % (ref 0–2)
BILIRUB SERPL-MCNC: 0.5 MG/DL (ref 0.2–1)
BUN SERPL-MCNC: 9 MG/DL (ref 7–18)
CALCIUM SERPL-MCNC: 8.3 MG/DL (ref 8.5–10.1)
CHLORIDE SERPL-SCNC: 109 MMOL/L (ref 98–107)
CO2 SERPL-SCNC: 23 MMOL/L (ref 21–32)
CREAT SERPL-MCNC: 0.7 MG/DL (ref 0.55–1.3)
EOSINOPHIL NFR BLD AUTO: 1 % (ref 0–3)
ERYTHROCYTE [DISTWIDTH] IN BLOOD BY AUTOMATED COUNT: 14.1 % (ref 11.6–14.8)
GLOBULIN SER-MCNC: 4.1 G/DL
HCT VFR BLD CALC: 37.6 % (ref 42–52)
HGB BLD-MCNC: 11.9 G/DL (ref 14.2–18)
LYMPHOCYTES NFR BLD AUTO: 27.6 % (ref 20–45)
MCV RBC AUTO: 74 FL (ref 80–99)
MONOCYTES NFR BLD AUTO: 6.3 % (ref 1–10)
NEUTROPHILS NFR BLD AUTO: 64.5 % (ref 45–75)
PLATELET # BLD: 262 K/UL (ref 150–450)
POTASSIUM SERPL-SCNC: 4.1 MMOL/L (ref 3.5–5.1)
RBC # BLD AUTO: 5.06 M/UL (ref 4.7–6.1)
SODIUM SERPL-SCNC: 141 MMOL/L (ref 136–145)
WBC # BLD AUTO: 9.6 K/UL (ref 4.8–10.8)

## 2019-11-07 RX ADMIN — TAMSULOSIN HYDROCHLORIDE SCH MG: 0.4 CAPSULE ORAL at 09:24

## 2019-11-07 RX ADMIN — ATORVASTATIN CALCIUM SCH MG: 80 TABLET, FILM COATED ORAL at 21:21

## 2019-11-07 RX ADMIN — ASPIRIN SCH MG: 81 TABLET, DELAYED RELEASE ORAL at 09:24

## 2019-11-07 RX ADMIN — DULOXETINE HYDROCHLORIDE SCH MG: 30 CAPSULE, DELAYED RELEASE ORAL at 09:24

## 2019-11-07 RX ADMIN — INSULIN ASPART SCH UNITS: 100 INJECTION, SOLUTION INTRAVENOUS; SUBCUTANEOUS at 06:13

## 2019-11-07 RX ADMIN — INSULIN ASPART SCH UNITS: 100 INJECTION, SOLUTION INTRAVENOUS; SUBCUTANEOUS at 16:03

## 2019-11-07 RX ADMIN — LISINOPRIL SCH MG: 20 TABLET ORAL at 09:27

## 2019-11-07 RX ADMIN — Medication SCH EA: at 09:24

## 2019-11-07 RX ADMIN — LISINOPRIL SCH MG: 20 TABLET ORAL at 17:15

## 2019-11-07 RX ADMIN — INSULIN ASPART SCH UNITS: 100 INJECTION, SOLUTION INTRAVENOUS; SUBCUTANEOUS at 21:27

## 2019-11-07 RX ADMIN — CHOLECALCIFEROL TAB 10 MCG (400 UNIT) SCH INTLU: 10 TAB at 09:24

## 2019-11-07 RX ADMIN — INSULIN ASPART SCH UNITS: 100 INJECTION, SOLUTION INTRAVENOUS; SUBCUTANEOUS at 11:05

## 2019-11-07 RX ADMIN — CALCIUM CARBONATE-VITAMIN D TAB 500 MG-200 UNIT SCH TAB: 500-200 TAB at 09:36

## 2019-11-07 RX ADMIN — CALCIUM CARBONATE-VITAMIN D TAB 500 MG-200 UNIT SCH TAB: 500-200 TAB at 17:14

## 2019-11-07 RX ADMIN — Medication SCH MG: at 09:24

## 2019-11-07 NOTE — NUR
NURSE NOTES:

Received report from JOEL Donald RN. Patient in bed AAO X1-2 Greek speaking with minimal 
English noted. Continuous cardiac monitoring in place per protocol, belongings checked and 
noted, head to toe assessment initiated with no noted skin issues observed. IV line intact 
and patent SL. Kept clean, dry, and comfortable in bed with no complaints of acute pain or 
discomfort at this time. Safety precaution initiated; side rails X3 up, call light within 
reach, bed in lowest position, brakes and alarm on at all times. Needs and wants anticipated 
and attended.



ABD US resulted. Continue to monitor.

## 2019-11-07 NOTE — NUR
CASE MANAGEMENT: INITIAL REVIEW



69 YR OLD MALE BIBA FROM [facility]



CC: AMS 



SI: METABOLIC ACIDOSIS; AMS

98.8   98   18   104/69   93% ON RA

LACTIC ACID 3.20; CA+ 8.3; H/H 11.9/37.6



IS:      IVF NS BOLUS X2

IV ZOSYN X1

SEROQUEL PO Q6/PRN

IM HALDOL X1

IV ATIVAN X1



**:2E TELE UNIT



DCP: RETURN TO [facility]



PLAN: US ABD

## 2019-11-07 NOTE — NUR
HAND-OFF: 

Report given to Parviz Holland RN. Patient stable. Attempted to get out of bed x2 for BM. 
Endorsed that pt is on bedpan and has diarrhea.

## 2019-11-07 NOTE — NUR
NURSE NOTES:

Patient received from Parviz CHAVEZ. Patient sleeping at this time. No s/sx of distress. RR even 
and unlabored on RA. Bed low and locked. Side rails up x2. Bed alarm on. Will continue to 
monitor.

## 2019-11-07 NOTE — DIAGNOSTIC IMAGING REPORT
Indication: Abnormal liver function tests, history of gallstones

 

Technique: Gray-scale and duplex images of the upper abdomen were obtained

 

Comparison: 9/9/2016. Reference also made to abdomen MRI 12/4/2018, abdomen CT from

12/3/2018

 

Findings: Exam is limited due to body habitus, overlying bowel gas, and patient being

contracted   Gallbladder demonstrates gallstones. No gallbladder wall thickening. 

Sonographic Akbar's sign is negative.  Common bile duct measures 5 mm in diameter. 

No intrahepatic biliary ductal dilatation.  Liver demonstrates diffusely increased

echogenicity, consistent with diffuse hepatocellular disease, most likely fatty

change.   Portal vein and hepatic veins are patent.   Pancreas is unremarkable.   

Spleen is unremarkable.  Left kidney measures 11.8 cm in length.  Right kidney

measures 11.8 cm length.  Both kidneys demonstrate normal echogenicity.  There is no

hydronephrosis.   No focal abnormality . Abdominal aorta is partially obscured by

bowel gas, visualized portions are non-aneurysmal .

 

Impression: Limited exam, as described

 

Cholelithiasis, also previously reported. Negative for dilated bile ducts

 

Liver demonstrates diffusely increased echogenicity, consistent with diffuse

hepatocellular disease, most likely fatty change. Not demonstrated on prior

ultrasound, equivocally evident on prior CT scan

 

Noted incomplete visualization of the abdominal aorta

## 2019-11-07 NOTE — NUR
HAND-OFF: 

Report given to JOEL Donald RN. Patient in bed asleep with no S/S of distress noted. Endorsed 
plan of care.

## 2019-11-07 NOTE — DIAGNOSTIC IMAGING REPORT
Indication: Shortness of breath

 

Technique: One view of the chest

 

Comparison: none

 

Findings: No acute infiltrates, effusions, or congestion. Tortuous calcified aorta.

Normal heart size. Upper mediastinum unremarkable.

 

Impression: No acute process.

## 2019-11-07 NOTE — CONSULTATION
DATE OF CONSULTATION:  11/07/2019

INFECTIOUS DISEASE CONSULTATION



CONSULTING PHYSICIAN:  Florin Bowles M.D.



PRIMARY ATTENDING PHYSICIAN:  Sancho Ibarra M.D.



REASON FOR CONSULT:  Acidosis, cholelithiasis, and diarrhea.



HISTORY OF PRESENT ILLNESS:  The patient is a 69-year-old  male

admitted yesterday from nursing facility with altered mental status and

confusion, was found to have metabolic acidosis, but no fever and

leukocytosis.



PAST MEDICAL HISTORY:  Significant for Alzheimer's dementia, schizophrenia,

diabetes mellitus, hypertension, CVA, left-sided weakness,

gastrointestinal bleeding in 2016 secondary to the peptic ulcer disease,

has history of cholelithiasis on previous admission in December 2018.  At

that time, MRI did not show cholecystitis or choledocholithiasis.  Has

BPH.



MEDICATIONS:  Atorvastatin, Aricept, vitamin C, aspirin, Cymbalta, ferrous

sulfate,  vitamin D, calcium plus vitamin D, lisinopril,

Tylenol, Protonix, insulin, and Seroquel.



ALLERGIES:  No known drug allergies.



SOCIAL HISTORY:  .  Nursing home resident.  No history obtainable

from the patient.



REVIEW OF SYSTEMS:  According to nurse, he has diarrhea.



PHYSICAL EXAMINATION:

VITAL SIGNS:  Temperature 97.7, pulse 83, and blood pressure

123/79.

GENERAL APPEARANCE:  No acute distress.

HEAD AND NECK:  Pink conjunctivae.

HEART:  Normal rate.

LUNGS:  Clear.

ABDOMEN:  Soft and nontender.

EXTREMITY:  He has no edema.



LABORATORY AND DIAGNOSTIC DATA:  WBC 9.6, hemoglobin 11.9, hematocrit 37.6,

and platelets is 262,000.  Sodium 141, potassium 4.1, chloride 109, bicarb

23, BUN 9, creatinine 0.7, and glucose is 170.  Lactic acid is 3.2.

Alkaline phosphatase is slightly elevated at 114.  Bilirubin 0.1.  AST and

ALT are within normal limits.  Chest x-ray was negative.  UA also

negative.



IMPRESSION:  Lactic acidosis.  The cause is unknown.  We will try to rule

out infection.  The patient has a history of cholelithiasis.  Has

diarrhea.  We will try to rule out C. Diff.  He has diabetes mellitus,

hypertension, BPH, dementia, and elevation in alkaline phosphatase.



RECOMMENDATIONS:  We will repeat abdominal ultrasound.  We will order a C. 
difficile

test.  For now, we will observe the patient off antibiotic.



At the end of my exam, I thank Dr. Ibarra for involving me in the care

of this patient.









  ______________________________________________

  Florin Bowles M.D.





DR:  SEBASTIÁN

D:  11/07/2019 12:03

T:  11/07/2019 19:48

JOB#:  [id number]

CC:



YANNA

## 2019-11-07 NOTE — HISTORY AND PHYSICAL REPORT
DATE OF ADMISSION:  11/06/2019

HISTORY OF PRESENT ILLNESS:  The patient comes with altered mental status,

more confused than his baseline, agitated with staff, rule out

encephalopathy, rule out urinary tract infection, lactic acid elevation,

rule out urinary tract infection as well.  The patient is a poor

historian, cannot get reliable history from the patient.



PAST MEDICAL HISTORY:  Hyperlipidemia, organic brain syndrome,

constipation, iron-deficiency anemia, NIDDM,  hypertension, GERD, BPH,

history of electrolyte imbalance, history of GI bleed, history of

gallstones, peptic ulcer disease, history of CVA in the past, history of

weakness as well.



PAST SURGICAL HISTORY:  Denies.



ALLERGIES:  No known allergies.



SOCIAL HISTORY:  Denies history of smoking, alcohol, or illicit drugs.

Comes from a nursing home.



REVIEW OF SYSTEMS:  HEENT:  Denies headaches.     RESPIRATORY:  Denies

shortness of breath.  Denies cough.    CARDIOVASCULAR:  Denies chest pain.

GASTROINTESTINAL:  Denies nausea, vomiting, or diarrhea.  EXTREMITIES:

Denies pain.    CENTRAL NERVOUS SYSTEMS:  Denies any change in vision or

speech pattern; however, he is a poor historian.



PHYSICAL EXAMINATION:

VITAL SIGNS:  Temperature 97.7, pulse 83, blood pressure 132/79.

HEENT:  PERRLA.

NECK:  Supple.  No lymphadenopathy.

CHEST:  Clear to auscultation.

CARDIOVASCULAR:  Regular rate and rhythm.

GASTROINTESTINAL:  Soft.  Positive bowel sounds.  No organomegaly.

EXTREMITIES:  A 1+ edema.  Reflexes equal on both sides.

NEUROLOGIC:  Poor historian, not oriented.



LABORATORY AND DIAGNOSTIC DATA:  WBC of 10.4, hemoglobin 11.7, platelets

388.  Sodium 141, potassium 4.6, BUN of 13, creatinine 0.9, and glucose of

136.



ASSESSMENT/PLAN:  Altered mental status, most likely due to urinary tract

infection and also patient is agitated.



I have asked Dr. Coronel, Dr. Chu, Dr. Florin Bowles, and 

______  see the patient for the altered mental status as well as for

agitation as well as for urinary tract infection workup.  Antibiotics per

Dr. Florin Bowles.  The patient also looks dry clinically, we will get on

Dr. Chu for dehydration treatment.









  ______________________________________________

  Sancho Ibarra M.D.





DR:  Klye

D:  11/07/2019 21:12

T:  11/07/2019 22:47

JOB#:  [id number]

CC:

## 2019-11-08 VITALS — SYSTOLIC BLOOD PRESSURE: 119 MMHG | DIASTOLIC BLOOD PRESSURE: 70 MMHG

## 2019-11-08 VITALS — DIASTOLIC BLOOD PRESSURE: 77 MMHG | SYSTOLIC BLOOD PRESSURE: 125 MMHG

## 2019-11-08 VITALS — DIASTOLIC BLOOD PRESSURE: 71 MMHG | SYSTOLIC BLOOD PRESSURE: 115 MMHG

## 2019-11-08 VITALS — SYSTOLIC BLOOD PRESSURE: 100 MMHG | DIASTOLIC BLOOD PRESSURE: 65 MMHG

## 2019-11-08 VITALS — DIASTOLIC BLOOD PRESSURE: 76 MMHG | SYSTOLIC BLOOD PRESSURE: 127 MMHG

## 2019-11-08 VITALS — DIASTOLIC BLOOD PRESSURE: 73 MMHG | SYSTOLIC BLOOD PRESSURE: 121 MMHG

## 2019-11-08 RX ADMIN — CALCIUM CARBONATE-VITAMIN D TAB 500 MG-200 UNIT SCH TAB: 500-200 TAB at 17:18

## 2019-11-08 RX ADMIN — INSULIN ASPART SCH UNITS: 100 INJECTION, SOLUTION INTRAVENOUS; SUBCUTANEOUS at 05:19

## 2019-11-08 RX ADMIN — TAMSULOSIN HYDROCHLORIDE SCH MG: 0.4 CAPSULE ORAL at 08:54

## 2019-11-08 RX ADMIN — Medication SCH MG: at 08:48

## 2019-11-08 RX ADMIN — CALCIUM CARBONATE-VITAMIN D TAB 500 MG-200 UNIT SCH TAB: 500-200 TAB at 08:48

## 2019-11-08 RX ADMIN — DULOXETINE HYDROCHLORIDE SCH MG: 30 CAPSULE, DELAYED RELEASE ORAL at 08:54

## 2019-11-08 RX ADMIN — LISINOPRIL SCH MG: 20 TABLET ORAL at 08:46

## 2019-11-08 RX ADMIN — ATORVASTATIN CALCIUM SCH MG: 80 TABLET, FILM COATED ORAL at 21:10

## 2019-11-08 RX ADMIN — Medication SCH EA: at 08:48

## 2019-11-08 RX ADMIN — INSULIN ASPART SCH UNITS: 100 INJECTION, SOLUTION INTRAVENOUS; SUBCUTANEOUS at 17:14

## 2019-11-08 RX ADMIN — INSULIN ASPART SCH UNITS: 100 INJECTION, SOLUTION INTRAVENOUS; SUBCUTANEOUS at 11:17

## 2019-11-08 RX ADMIN — CHOLECALCIFEROL TAB 10 MCG (400 UNIT) SCH INTLU: 10 TAB at 08:47

## 2019-11-08 RX ADMIN — INSULIN ASPART SCH UNITS: 100 INJECTION, SOLUTION INTRAVENOUS; SUBCUTANEOUS at 21:11

## 2019-11-08 RX ADMIN — LISINOPRIL SCH MG: 20 TABLET ORAL at 17:18

## 2019-11-08 RX ADMIN — ASPIRIN SCH MG: 81 TABLET, DELAYED RELEASE ORAL at 08:53

## 2019-11-08 NOTE — NUR
NURSE NOTES: Patient calmly laying in bed, napping.  Attempted to get patient oob as 
tolerated, but patient unable to stand with assist at bedside.  Sitting at edge of bed 
patient appeared unbalanced at times --unable to sit at edge independently.  Skin in 
entirety clean , dry and intact with no redness or irritation. Voided large amt of clear 
yellow--incontinent. No BM. Provided hygiene and cahnged sheets.  Cont'd to follow plan of 
care.

## 2019-11-08 NOTE — NUR
NURSE NOTES:

Received report from KATHY Bull. Patient is awake, lying in semi townsend's; resting 
comfortably. A/Ox4. Primarily Hungarian speaking. Denies pain at this time. No signs of acute 
distress noted. Checked IV site and flushed. No erythema, bleeding or infiltration noted. 
Bed at lowest position, brakes on, siderailsx3. Call light within reach. Will continue to 
monitor.

## 2019-11-08 NOTE — CDS PHYSICIAN QUERY
Clarification is required for compliance, coding accuracy, and to reflect 
severity of illness for this patient



Dear Dr. Florin Bowles M.D.   Date: 11/08/2019



/CDS Name: Francisco Umanzor   



The patient is a 69-year-old  male

admitted yesterday from nursing facility with altered mental status and

confusion, was found to have metabolic acidosis, but no fever and

leukocytosis.



"Altered Mental Status" documented in consultation notes





Please indicate the nature and chronicity of the condition below:



[] Metabolic Encephalopathy

[] Toxic Encephalopathy

[] Toxic - Metabolic Encephalopathy

[] Encephalopathy, Other _____________________

[] Dementia with Delirium 

[] Hypoxic encephalopathy

[] Posterior reversible encephalopathy syndrome

[] Other: ____________________________________

[] Not Applicable







Present on Admission:  []  Yes          []  No         []  Clinically 
Undetermined





_________________                                    _____________

Physician signature                                       Date



Please also document in your Progress Notes and/or Discharge Summary and 
indicate if the condition was present on admission.

MTDD

## 2019-11-08 NOTE — NUR
NURSE NOTES: 530pm: Patient was napping but woke for dinner. Encouraged patient to slow down 
while eating.  AOX3 with calm, cooperative affect. call bell in reach. Patient currently 
clean and dry.  Wife at bedside aiding with feeding. Patient ate 100% of dinner with gusto 
and no sign of difficultuy swallowing--no coughing and no sign of difficulty breathing.  
Patient's wife stated "he seems much better, more or less back to normal"

## 2019-11-08 NOTE — NUR
CASE MANAGEMENT:  REVIEW



11/8/19



SI: METABOLIC ACIDOSIS; AMS

97.9   71   20   125/77   97% ON RA





IS:     FLOMAX PO QD

LISINOPRIL PO BID

PROCARDIA PO QD

PROTONIX PO QD

 LIPITOR PO HS

NOVOLOG SQ AC&HS

DONEPEZIL PO QHS

VIT C PO QD

ASPIRIN PO QD

FEOSOL PO BID

MVT PO QD

VIT D PO QD

OSCAL PO BID



DCP: RETURN TO [facility]






-------------------------------------------------------------------------------

Addendum: 11/08/19 at 1227 by EWA BRONSON LVN

-------------------------------------------------------------------------------

CASE MANAGEMENT:  REVIEW



11/8/19



SI: METABOLIC ACIDOSIS; AMS

97.9   71   20   125/77   97% ON RA





IS:     FLOMAX PO QD

LISINOPRIL PO BID

PROCARDIA PO QD

PROTONIX PO QD

 LIPITOR PO HS

NOVOLOG SQ AC&HS

DONEPEZIL PO QHS

VIT C PO QD

ASPIRIN PO QD

FEOSOL PO BID

MVT PO QD

VIT D PO QD

OSCAL PO BID



DCP: RETURN TO [facility]



PLAN: NEPHRO CONSULT

## 2019-11-08 NOTE — NUR
NURSE NOTES: Received report from KATHY das. Patient currently asleep and breathing easily on 
RA with no signs of cardiac or respiratory distress.  Continued with plan of care. Bed in 
lowest , locked postion , fall precautions in place and bed alarm on.

## 2019-11-08 NOTE — NUR
NURSE NOTES: TRE (conchis) called to inform she will be calling Dr Ibarra regarding 
readiness for DC.  


-------------------------------------------------------------------------------

Addendum: 11/08/19 at 1039 by Villa Todd RN

-------------------------------------------------------------------------------

No diarrhea curently and cdiff result was negative.

## 2019-11-08 NOTE — NUR
HAND-OFF: 

Report given to ERLIN Todd Patient in bed with no S/S of distress. Endorsed plan of 
care.

## 2019-11-08 NOTE — NUR
NURSE NOTES: Report given to KATHY Rodriguez. Patient stable adn no co pain  with no sign of 
cardiac or respiratory distress. Bed in lowest, locked position with call bell in reach.

## 2019-11-08 NOTE — PROGRESS NOTE
DATE:  11/08/2019

SUBJECTIVE:  The patient is feeling better, cooperative, less agitated,

more redirectable.



MENTAL STATUS EXAMINATION:  The patient is alert, oriented times self,

waxing and waning consciousness.  Mood is neutral to anxious.  Affect is

flat.  Thought process is concrete.  Thought content, no suicidal or

homicidal ideation.  Cognition is impaired.  Insight and judgment

impaired.



ASSESSMENT:  Acute encephalopathy.



PLAN:

1. We will continue the Seroquel.

2. Continue the Cymbalta.

3. Provide the patient with reality orientation and supportive

therapy.









  ______________________________________________

  Humberto Coronel M.D.





DR:  Pepe

D:  11/08/2019 14:38

T:  11/08/2019 17:52

JOB#:  [id number]

CC:

## 2019-11-08 NOTE — GENERAL PROGRESS NOTE
Assessment/Plan


Problem List:  


(1) Altered mental status


ICD Codes:  R41.82 - Altered mental status, unspecified


SNOMED:  168131111


(2) hyper glycemia


(3) Sepsis


ICD Codes:  A41.9 - Sepsis, unspecified organism


SNOMED:  97557779


Status:  progressing


Assessment/Plan:


ams


still confused


niddm


sepsis 


abx per id


afebrile





Subjective


ROS Limited/Unobtainable:  Yes


Allergies:  


Coded Allergies:  


     No Known Allergies (Unverified , 9/5/16)





Objective





Last 24 Hour Vital Signs








  Date Time  Temp Pulse Resp B/P (MAP) Pulse Ox O2 Delivery O2 Flow Rate FiO2


 


11/8/19 17:18    119/70    


 


11/8/19 16:00 98.3 80 20 119/70 (86) 97   


 


11/8/19 16:00  78      


 


11/8/19 12:00 98.3 73 19 121/73 (89) 100   


 


11/8/19 12:00  81      


 


11/8/19 09:00      Room Air  


 


11/8/19 08:54  71  125/77    


 


11/8/19 08:46    125/77    


 


11/8/19 08:36 97.9 71 20 125/77 (93) 97   


 


11/8/19 08:00  79      


 


11/8/19 04:00  69      


 


11/8/19 04:00 97.6 80 20 127/76 (93) 95   


 


11/8/19 00:00  74      


 


11/8/19 00:00 97.3 70 18 115/71 (86) 100   

















Intake and Output  


 


 11/7/19 11/8/19





 19:00 07:00


 


Intake Total 120 ml 


 


Balance 120 ml 


 


  


 


Intake Oral 120 ml 


 


# Voids  3


 


# Bowel Movements 1 2








Height (Feet):  5


Height (Inches):  5.00


Weight (Pounds):  180


Cardiovascular:  normal peripheral pulses


Respiratory/Chest:  lungs clear











Sancho Ibarra MD Nov 8, 2019 22:23

## 2019-11-08 NOTE — INFECTIOUS DISEASES PROG NOTE
Assessment/Plan


Assessment/Plan


IMPRESSION:  


Lactic acidosis.  


cholelithiasis.  


Diarrhea resolved negative C. Diff.  


Diabetes mellitus,


hypertension, 


BPH, 


dementia, 


elevation in alkaline phosphatase.


Altered mental status





RECOMMENDATIONS:  


 we will observe the patient off antibiotic.





Subjective


ROS Limited/Unobtainable:  Yes


Constitutional:  Reports: no symptoms


Respiratory:  Reports: no symptoms


Gastrointestinal/Abdominal:  Reports: no symptoms


Genitourinary:  Reports: no symptoms


Allergies:  


Coded Allergies:  


     No Known Allergies (Unverified , 9/5/16)





Objective


Vital Signs





Last 24 Hour Vital Signs








  Date Time  Temp Pulse Resp B/P (MAP) Pulse Ox O2 Delivery O2 Flow Rate FiO2


 


11/8/19 09:00      Room Air  


 


11/8/19 08:54  71  125/77    


 


11/8/19 08:46    125/77    


 


11/8/19 08:36 97.9 71 20 125/77 (93) 97   


 


11/8/19 08:00  79      


 


11/8/19 04:00  69      


 


11/8/19 04:00 97.6 80 20 127/76 (93) 95   


 


11/8/19 00:00  74      


 


11/8/19 00:00 97.3 70 18 115/71 (86) 100   


 


11/7/19 21:00      Room Air  


 


11/7/19 20:00 97.9 76 18 101/70 (80) 100   


 


11/7/19 20:00  81      


 


11/7/19 17:15    137/73    


 


11/7/19 16:00  82      


 


11/7/19 16:00 97.6 80 18 137/73 (94) 95   


 


11/7/19 12:00  86      








Height (Feet):  5


Height (Inches):  5.00


Weight (Pounds):  180


General Appearance:  no acute distress


HEENT:  mucous membranes moist


Respiratory/Chest:  lungs clear


Cardiovascular:  normal rate


Abdomen:  soft, non tender


Extremities:  no edema


Neurologic/Psychiatric:  alert, responsive





Microbiology








 Date/Time


Source Procedure


Growth Status


 


 


 11/6/19 19:45


Blood Blood Culture - Preliminary


NO GROWTH AFTER 24 HOURS Resulted


 


 11/6/19 19:30


Blood Blood Culture - Preliminary


NO GROWTH AFTER 24 HOURS Resulted


 


 11/7/19 11:00


Stool Clostridium difficile Toxin Assay - Final Complete


 


 11/6/19 21:00


Rectum  Received











Current Medications








 Medications


  (Trade)  Dose


 Ordered  Sig/Eri


 Route


 PRN Reason  Start Time


 Stop Time Status Last Admin


Dose Admin


 


 Acetaminophen


  (Tylenol)  650 mg  Q6H  PRN


 ORAL


 Mild Pain/Temp > 100.5  11/7/19 06:45


 12/7/19 06:44   


 


 


 Ascorbic Acid


  (Vitamin C)  500 mg  DAILY


 ORAL


   11/7/19 09:00


 12/7/19 08:59  11/8/19 08:48


 


 


 Aspirin


  (Ecotrin)  81 mg  DAILY


 ORAL


   11/7/19 09:00


 12/7/19 08:59  11/8/19 08:53


 


 


 Atorvastatin


 Calcium


  (Lipitor)  80 mg  BEDTIME


 ORAL


   11/7/19 21:00


 12/7/19 20:59  11/7/19 21:21


 


 


 Calcium/Vitamin D


  (OsCal D)  1 tab  BID


 ORAL


   11/7/19 09:00


 12/7/19 08:59  11/8/19 08:48


 


 


 Dextrose


  (Dextrose 50%)  25 ml  Q30M  PRN


 IV


 Hypoglycemia  11/7/19 00:45


 12/7/19 00:44   


 


 


 Dextrose


  (Dextrose 50%)  50 ml  Q30M  PRN


 IV


 Hypoglycemia  11/7/19 00:45


 12/7/19 00:44   


 


 


 Donepezil HCl


  (Aricept)  10 mg  QHS


 ORAL


   11/7/19 21:00


 12/7/19 20:59  11/7/19 21:21


 


 


 Duloxetine HCl


  (Cymbalta)  30 mg  DAILY


 ORAL


   11/7/19 09:00


 12/7/19 08:59  11/8/19 08:54


 


 


 Ferrous Sulfate


  (Feosol)  325 mg  BID


 ORAL


   11/7/19 09:00


 12/7/19 08:59  11/8/19 08:46


 


 


 Insulin Aspart


  (NovoLOG)  BS control  BEFORE MEALS AND  HS


 SUBQ


   11/7/19 06:30


 12/7/19 06:29  11/8/19 11:17


 


 


 Lisinopril


  (Prinivil)  40 mg  BID


 ORAL


   11/7/19 09:00


 12/7/19 08:59  11/8/19 08:46


 


 


 Multivitamins


 Therapeutic


  (Therapeutic


 Multivitamin)  1 ea  DAILY


 ORAL


   11/7/19 09:00


 12/7/19 08:59  11/8/19 08:48


 


 


 Nifedipine


  (Procardia XL)  60 mg  DAILY


 ORAL


   11/7/19 09:00


 12/7/19 08:59  11/8/19 08:54


 


 


 Pantoprazole


  (Protonix)  40 mg  DAILY@0630


 ORAL


   11/7/19 06:30


 12/7/19 06:29  11/8/19 05:19


 


 


 Quetiapine


 Fumarate


  (SEROquel)  25 mg  Q6HR  PRN


 ORAL


 For Anxiety  11/7/19 03:30


 12/7/19 03:29   


 


 


 Tamsulosin HCl


  (Flomax)  0.4 mg  DAILY


 ORAL


   11/7/19 09:00


 12/7/19 08:59  11/8/19 08:54


 


 


 Vitamin D


  (Vitamin D)  1,000 intlu  DAILY


 ORAL


   11/7/19 09:00


 12/7/19 08:59  11/8/19 08:47


 

















Florin Bowles MD Nov 8, 2019 12:04

## 2019-11-08 NOTE — CONSULTATION
DATE OF CONSULTATION:

HISTORY OF PRESENT ILLNESS:  This is a 69-year-old male with a history of

multiple medical issues including diabetic mellitus, hypertension,

gastroesophageal reflux disease, BPH, iron-deficiency anemia, constipation

hyperlipidemia, CVA who has been admitted to the hospital for medical

stabilization.  The patient is confused and agitated, unable to be engaged

during the evaluation, has waxing and waning consciousness, memory

impairment.  Poor insight and memory.



PAST PSYCHIATRY HISTORY:  Dementia.



PAST MEDICAL HISTORY:  Significant for diabetes, hyponatremia, MRSA,

anemia, cholelithiasis, ______ .



ALLERGIES:  No known drug allergies.



SUBSTANCE ABUSE HISTORY:  No known history of illicit drug use or

alcohol.



MENTAL STATUS EXAMINATION:  The patient is alert, disoriented.  Mood is

anxious.  Affect is constricted.  Congruent mood.  Thought process is

concrete.  Thought content, no suicidal or homicidal ideation.  Cognition

is impaired.  Insight and judgement is impaired.



ASSESSMENT:

Axis I  Dementia.

Acute metabolic encephalopathy.

Axis II  Deferred.

Axis III  As above.

Axis IV  Low.

Axis V  20



PLAN:

1. The patient will be started on Seroquel 25 mg every 6 hours as needed

for agitation.

2. Cymbalta 30 mg in the morning.

3. ______ will be discontinued.

4. We will continue to follow and readjust the medications.









  ______________________________________________

  Humberto Coronel M.D.





DR:  Pepe

D:  11/08/2019 14:36

T:  11/08/2019 18:12

JOB#:  [id number]

CC:

## 2019-11-09 VITALS — SYSTOLIC BLOOD PRESSURE: 106 MMHG | DIASTOLIC BLOOD PRESSURE: 66 MMHG

## 2019-11-09 VITALS — SYSTOLIC BLOOD PRESSURE: 108 MMHG | DIASTOLIC BLOOD PRESSURE: 64 MMHG

## 2019-11-09 VITALS — SYSTOLIC BLOOD PRESSURE: 121 MMHG | DIASTOLIC BLOOD PRESSURE: 76 MMHG

## 2019-11-09 VITALS — SYSTOLIC BLOOD PRESSURE: 104 MMHG | DIASTOLIC BLOOD PRESSURE: 51 MMHG

## 2019-11-09 VITALS — SYSTOLIC BLOOD PRESSURE: 136 MMHG | DIASTOLIC BLOOD PRESSURE: 78 MMHG

## 2019-11-09 VITALS — SYSTOLIC BLOOD PRESSURE: 126 MMHG | DIASTOLIC BLOOD PRESSURE: 74 MMHG

## 2019-11-09 RX ADMIN — CHOLECALCIFEROL TAB 10 MCG (400 UNIT) SCH INTLU: 10 TAB at 08:45

## 2019-11-09 RX ADMIN — LISINOPRIL SCH MG: 20 TABLET ORAL at 17:14

## 2019-11-09 RX ADMIN — LISINOPRIL SCH MG: 20 TABLET ORAL at 08:47

## 2019-11-09 RX ADMIN — INSULIN ASPART SCH UNITS: 100 INJECTION, SOLUTION INTRAVENOUS; SUBCUTANEOUS at 06:44

## 2019-11-09 RX ADMIN — CALCIUM CARBONATE-VITAMIN D TAB 500 MG-200 UNIT SCH TAB: 500-200 TAB at 17:14

## 2019-11-09 RX ADMIN — Medication SCH MG: at 08:45

## 2019-11-09 RX ADMIN — CALCIUM CARBONATE-VITAMIN D TAB 500 MG-200 UNIT SCH TAB: 500-200 TAB at 08:45

## 2019-11-09 RX ADMIN — INSULIN ASPART SCH UNITS: 100 INJECTION, SOLUTION INTRAVENOUS; SUBCUTANEOUS at 11:24

## 2019-11-09 RX ADMIN — INSULIN ASPART SCH UNITS: 100 INJECTION, SOLUTION INTRAVENOUS; SUBCUTANEOUS at 17:11

## 2019-11-09 RX ADMIN — TAMSULOSIN HYDROCHLORIDE SCH MG: 0.4 CAPSULE ORAL at 08:40

## 2019-11-09 RX ADMIN — Medication SCH EA: at 08:45

## 2019-11-09 RX ADMIN — INSULIN ASPART SCH UNITS: 100 INJECTION, SOLUTION INTRAVENOUS; SUBCUTANEOUS at 20:26

## 2019-11-09 RX ADMIN — LISINOPRIL SCH MG: 20 TABLET ORAL at 08:46

## 2019-11-09 RX ADMIN — ASPIRIN SCH MG: 81 TABLET, DELAYED RELEASE ORAL at 08:41

## 2019-11-09 RX ADMIN — ATORVASTATIN CALCIUM SCH MG: 80 TABLET, FILM COATED ORAL at 20:25

## 2019-11-09 RX ADMIN — DULOXETINE HYDROCHLORIDE SCH MG: 30 CAPSULE, DELAYED RELEASE ORAL at 08:40

## 2019-11-09 NOTE — CARDIOLOGY REPORT
--------------- APPROVED REPORT --------------





EKG Measurement

Heart [id number]

AL [id number]

[id number]

[id number]

[id number]





Normal sinus rhythm

Left axis deviation

Inferior infarct, age undetermined

Abnormal ECG

## 2019-11-09 NOTE — NUR
NURSE NOTES:Patient co dizziness this morn--held 9am lisinopril for decreased (but within 
norm limits) BP.  Patient not interested in breakfast, but 100% of lunch tlerated with no 
nvd and stated "I'm hungry. I feel good now."  OOB to chair with assist x2 (turn and pivot) 
to sit up for 45 minutes with RN or CNA in room to ensure safety. Denies pain.  Occasionally 
patient removed telemetry electrodes--reorientation needed . Patient forgetful but calm and 
cooperative.  Cont'd with plan of care.

patient voided 2x this morning--hygiene provided.  No BM this morning.;


-------------------------------------------------------------------------------

Addendum: 11/09/19 at 1336 by Villa Todd RN

-------------------------------------------------------------------------------

Patient denies dizziness currently.

## 2019-11-09 NOTE — NUR
NURSE NOTES:AOX3 but forgetful and occasionally appears confused--patient stated that he 
doesn't understand why he can't go home.

## 2019-11-09 NOTE — NUR
NURSE NOTES:Received report from KATHY Rodriguez. Patient asleep stable on tele monitoring 
with NSR and 64 bpm. VSS.  Bed in lowest, locked position with bed alarm set and call bell 
in reach.

## 2019-11-09 NOTE — NUR
NURSE NOTES:

Received report from KATHY Guerrero. Patient is awake and responsive, breathing regular and 
unlabored with no distress noted at this time. Patient denies any pain or discomfort at this 
time. Bed is in lowest position, breaks engaged, and call light within reach at all times. 
Will continue to monitor.

## 2019-11-09 NOTE — GENERAL PROGRESS NOTE
Assessment/Plan


Problem List:  


(1) Altered mental status


ICD Codes:  R41.82 - Altered mental status, unspecified


SNOMED:  017948789


(2) hyper glycemia


(3) Sepsis


ICD Codes:  A41.9 - Sepsis, unspecified organism


SNOMED:  60021922


Status:  progressing


Assessment/Plan:


sugar is improving


dc planning


reviewed chart and labs


niddm


sepsis





Subjective


ROS Limited/Unobtainable:  Yes


Allergies:  


Coded Allergies:  


     No Known Allergies (Unverified , 9/5/16)





Objective





Last 24 Hour Vital Signs








  Date Time  Temp Pulse Resp B/P (MAP) Pulse Ox O2 Delivery O2 Flow Rate FiO2


 


11/9/19 17:14    106/66    


 


11/9/19 16:00  72      


 


11/9/19 16:00 98.1 63 18 106/66 (79) 97   


 


11/9/19 12:00  60      


 


11/9/19 11:28 96.8 69 18 104/51 (68) 97   


 


11/9/19 09:00      Room Air  


 


11/9/19 08:47    108/64    


 


11/9/19 08:40  60  108/64    


 


11/9/19 08:13  60      


 


11/9/19 07:54 97.6 61 18 108/64 (79) 96   


 


11/9/19 04:00  66      


 


11/9/19 04:00 97.8 63 16 126/74 (91) 96   


 


11/9/19 00:00 98.1 74 16 136/78 (97) 97   


 


11/9/19 00:00  78      


 


11/8/19 21:00      Room Air  


 


11/8/19 20:00 98.2 82 18 100/65 (77) 98   


 


11/8/19 20:00  99      

















Intake and Output  


 


 11/8/19 11/9/19





 19:00 07:00


 


Intake Total 850 ml 260 ml


 


Balance 850 ml 260 ml


 


  


 


Intake Oral 850 ml 260 ml


 


# Voids 3 2


 


# Bowel Movements 1 1








Height (Feet):  5


Height (Inches):  5.00


Weight (Pounds):  180


Cardiovascular:  normal rate


Respiratory/Chest:  lungs clear


Abdomen:  soft











Sancho Ibarra MD Nov 9, 2019 19:55

## 2019-11-09 NOTE — NUR
NURSE NOTES: Report given to Char/ Latia RNs.  Patient awake and talkative, smiling.  
Bed in lowest , locked position and bed alarm set.


-------------------------------------------------------------------------------

Addendum: 11/09/19 at 1913 by Villa Todd RN

-------------------------------------------------------------------------------

Call bell in adelaida.

## 2019-11-10 VITALS — DIASTOLIC BLOOD PRESSURE: 80 MMHG | SYSTOLIC BLOOD PRESSURE: 116 MMHG

## 2019-11-10 VITALS — SYSTOLIC BLOOD PRESSURE: 105 MMHG | DIASTOLIC BLOOD PRESSURE: 62 MMHG

## 2019-11-10 VITALS — SYSTOLIC BLOOD PRESSURE: 94 MMHG | DIASTOLIC BLOOD PRESSURE: 45 MMHG

## 2019-11-10 VITALS — SYSTOLIC BLOOD PRESSURE: 100 MMHG | DIASTOLIC BLOOD PRESSURE: 75 MMHG

## 2019-11-10 VITALS — DIASTOLIC BLOOD PRESSURE: 76 MMHG | SYSTOLIC BLOOD PRESSURE: 125 MMHG

## 2019-11-10 VITALS — SYSTOLIC BLOOD PRESSURE: 95 MMHG | DIASTOLIC BLOOD PRESSURE: 65 MMHG

## 2019-11-10 VITALS — SYSTOLIC BLOOD PRESSURE: 100 MMHG | DIASTOLIC BLOOD PRESSURE: 58 MMHG

## 2019-11-10 RX ADMIN — CALCIUM CARBONATE-VITAMIN D TAB 500 MG-200 UNIT SCH TAB: 500-200 TAB at 08:34

## 2019-11-10 RX ADMIN — DULOXETINE HYDROCHLORIDE SCH MG: 30 CAPSULE, DELAYED RELEASE ORAL at 08:32

## 2019-11-10 RX ADMIN — INSULIN ASPART SCH UNITS: 100 INJECTION, SOLUTION INTRAVENOUS; SUBCUTANEOUS at 21:32

## 2019-11-10 RX ADMIN — ASPIRIN SCH MG: 81 TABLET, DELAYED RELEASE ORAL at 08:33

## 2019-11-10 RX ADMIN — LISINOPRIL SCH MG: 20 TABLET ORAL at 08:34

## 2019-11-10 RX ADMIN — CHOLECALCIFEROL TAB 10 MCG (400 UNIT) SCH INTLU: 10 TAB at 08:34

## 2019-11-10 RX ADMIN — INSULIN ASPART SCH UNITS: 100 INJECTION, SOLUTION INTRAVENOUS; SUBCUTANEOUS at 06:28

## 2019-11-10 RX ADMIN — INSULIN ASPART SCH UNITS: 100 INJECTION, SOLUTION INTRAVENOUS; SUBCUTANEOUS at 11:29

## 2019-11-10 RX ADMIN — TAMSULOSIN HYDROCHLORIDE SCH MG: 0.4 CAPSULE ORAL at 08:33

## 2019-11-10 RX ADMIN — ATORVASTATIN CALCIUM SCH MG: 80 TABLET, FILM COATED ORAL at 21:00

## 2019-11-10 RX ADMIN — Medication SCH EA: at 08:34

## 2019-11-10 RX ADMIN — INSULIN ASPART SCH UNITS: 100 INJECTION, SOLUTION INTRAVENOUS; SUBCUTANEOUS at 17:05

## 2019-11-10 RX ADMIN — Medication SCH MG: at 08:34

## 2019-11-10 RX ADMIN — CALCIUM CARBONATE-VITAMIN D TAB 500 MG-200 UNIT SCH TAB: 500-200 TAB at 17:09

## 2019-11-10 NOTE — NUR
NURSE NOTES: Ptient Blood pressure decreased but patient stated "not dizzy and I feel good". 
Continuing to monitor and recheck BP in 30mins


-------------------------------------------------------------------------------

Addendum: 11/10/19 at 1120 by Villa Todd RN

-------------------------------------------------------------------------------

Dr Ibarra at bedside--discussed decreased BP with this RN. See new order/changes to 
lisinopril.

-------------------------------------------------------------------------------

Addendum: 11/10/19 at 1158 by Villa Todd RN

-------------------------------------------------------------------------------

Recheck of BP at noon: 95/65 patient still asymptomatic with NSR on tele and no co dizziness 
or lightheadedness.

## 2019-11-10 NOTE — NUR
NURSE NOTES: Received report from KATHY Pardo. Patient asleep but awoke when at bedside adn 
stated 'good morning'.  Denies pain or discomfort.  Bed in lowest, locked postion with alarm 
on. Call bell in reach. offered water, refused. Condom cath in place with no leakage and 
asymptomatic. Skin intact, clean , dry. Patient appears alert and oriented with calm affect. 
Breathing easily on RA. NSR on tele. Per  note placed last nite, patient is DC planning 
phase.  Continuing plan of care.

## 2019-11-10 NOTE — NUR
NURSE NOTES: Patient moved next to RN station. Patient found in bed laying with head at foot 
of bed.  Restless and talkative--patient repositoned  in bed with pillow supports . Condom 
cath still in place (clean, dry , intact).  Charting in room to ensure safety.  Currently 
patient is lightly napping--bed alarm on.

## 2019-11-10 NOTE — NUR
RD ASSESSMENT & RECOMMENDATIONS

SEE CARE ACTIVITY FOR COMPLETE ASSESSMENT



DAILY ESTIMATED NEEDS:

Needs based on DM; 66kg adj 

25-30  kcals/kg 

6232-1087  total kcals

1-1.5  g protein/kg

66-99  g total protein 

25-30  mL/kg

[id number]  total fluid mLs



NUTRITION DIAGNOSIS:

Altered nutrition related lab values r/t Diabetes, AEB A1C 7.4, accuchecks

(127-232), urine ketones 1+.



CURRENT DIET: CCHO Low, Mechanical soft chopped.     





PO DIET RECOMMENDATIONS:

CCHO Low, LOW NA (texture per SLP) 





RECOMMENDATIONS:

1) Obtain a calibrated bed scale wt/ standing if able  

2) Monitor BG levels, possible need for long acting insulin 

3) F/up w/ SLP 

4) Rec mealtime supervision for L side deficits and ability to self feed

## 2019-11-10 NOTE — GENERAL PROGRESS NOTE
Assessment/Plan


Problem List:  


(1) Altered mental status


ICD Codes:  R41.82 - Altered mental status, unspecified


SNOMED:  938800052


(2) hyper glycemia


(3) Sepsis


ICD Codes:  A41.9 - Sepsis, unspecified organism


SNOMED:  07720774


Status:  progressing


Assessment/Plan:


no acute events


no fever


check lytes


and sugar


reviewed chart and labs


niddm


sepsis





Subjective


ROS Limited/Unobtainable:  Yes


Allergies:  


Coded Allergies:  


     No Known Allergies (Unverified , 9/5/16)





Objective





Last 24 Hour Vital Signs








  Date Time  Temp Pulse Resp B/P (MAP) Pulse Ox O2 Delivery O2 Flow Rate FiO2


 


11/10/19 09:19      Room Air  


 


11/10/19 08:34  75  116/80    


 


11/10/19 08:34    116/80    


 


11/10/19 08:00  74      


 


11/10/19 08:00 98.1 75 20 116/80 (92) 98   


 


11/10/19 04:00  82      


 


11/10/19 04:00 97.5 68 16 125/76 (92) 99   


 


11/10/19 00:00 97.7 78 16 100/58 (72) 98   


 


11/10/19 00:00  74      


 


11/9/19 21:00      Room Air  


 


11/9/19 20:00 96.5 78 20 121/76 (91) 98   


 


11/9/19 20:00  77      


 


11/9/19 17:14    106/66    


 


11/9/19 16:00  72      


 


11/9/19 16:00 98.1 63 18 106/66 (79) 97   


 


11/9/19 12:00  60      


 


11/9/19 11:28 96.8 69 18 104/51 (68) 97   

















Intake and Output  


 


 11/9/19 11/10/19





 19:00 07:00


 


Intake Total 600 ml 


 


Balance 600 ml 


 


  


 


Intake Oral 600 ml 


 


# Voids 3 3


 


# Bowel Movements 1 1








Height (Feet):  5


Height (Inches):  5.00


Weight (Pounds):  180


Neck:  supple


Cardiovascular:  normal rate


Respiratory/Chest:  lungs clear


Abdomen:  soft











Sancho Ibarra MD Nov 10, 2019 11:01

## 2019-11-10 NOTE — INFECTIOUS DISEASES PROG NOTE
Assessment/Plan


Assessment/Plan


IMPRESSION:  


Lactic acidosis.  


cholelithiasis.  


Diarrhea resolved negative C. Diff.  


Diabetes mellitus,


hypertension, 


BPH, 


dementia, 


elevation in alkaline phosphatase.


Altered mental status





RECOMMENDATIONS:  


 we will observe the patient off antibiotic.





Subjective


ROS Limited/Unobtainable:  Yes


Neurologic:  Reports: confusion


Allergies:  


Coded Allergies:  


     No Known Allergies (Unverified , 9/5/16)





Objective


Vital Signs





Last 24 Hour Vital Signs








  Date Time  Temp Pulse Resp B/P (MAP) Pulse Ox O2 Delivery O2 Flow Rate FiO2


 


11/10/19 09:19      Room Air  


 


11/10/19 08:34  75  116/80    


 


11/10/19 08:34    116/80    


 


11/10/19 08:00  74      


 


11/10/19 08:00 98.1 75 20 116/80 (92) 98   


 


11/10/19 04:00  82      


 


11/10/19 04:00 97.5 68 16 125/76 (92) 99   


 


11/10/19 00:00 97.7 78 16 100/58 (72) 98   


 


11/10/19 00:00  74      


 


11/9/19 21:00      Room Air  


 


11/9/19 20:00 96.5 78 20 121/76 (91) 98   


 


11/9/19 20:00  77      


 


11/9/19 17:14    106/66    


 


11/9/19 16:00  72      


 


11/9/19 16:00 98.1 63 18 106/66 (79) 97   


 


11/9/19 12:00  60      


 


11/9/19 11:28 96.8 69 18 104/51 (68) 97   








Height (Feet):  5


Height (Inches):  5.00


Weight (Pounds):  180


General Appearance:  no acute distress


HEENT:  mucous membranes moist


Respiratory/Chest:  lungs clear


Cardiovascular:  normal rate


Abdomen:  soft, non tender


Extremities:  no edema


Neurologic/Psychiatric:  other - sleeping





Current Medications








 Medications


  (Trade)  Dose


 Ordered  Sig/Eri


 Route


 PRN Reason  Start Time


 Stop Time Status Last Admin


Dose Admin


 


 Acetaminophen


  (Tylenol)  650 mg  Q6H  PRN


 ORAL


 Mild Pain/Temp > 100.5  11/7/19 06:45


 12/7/19 06:44   


 


 


 Ascorbic Acid


  (Vitamin C)  500 mg  DAILY


 ORAL


   11/7/19 09:00


 12/7/19 08:59  11/10/19 08:34


 


 


 Aspirin


  (Ecotrin)  81 mg  DAILY


 ORAL


   11/7/19 09:00


 12/7/19 08:59  11/10/19 08:33


 


 


 Atorvastatin


 Calcium


  (Lipitor)  80 mg  BEDTIME


 ORAL


   11/7/19 21:00


 12/7/19 20:59  11/9/19 20:25


 


 


 Calcium/Vitamin D


  (OsCal D)  1 tab  BID


 ORAL


   11/7/19 09:00


 12/7/19 08:59  11/10/19 08:34


 


 


 Dextrose


  (Dextrose 50%)  25 ml  Q30M  PRN


 IV


 Hypoglycemia  11/7/19 00:45


 12/7/19 00:44   


 


 


 Dextrose


  (Dextrose 50%)  50 ml  Q30M  PRN


 IV


 Hypoglycemia  11/7/19 00:45


 12/7/19 00:44   


 


 


 Duloxetine HCl


  (Cymbalta)  30 mg  DAILY


 ORAL


   11/7/19 09:00


 12/7/19 08:59  11/10/19 08:32


 


 


 Ferrous Sulfate


  (Feosol)  325 mg  BID


 ORAL


   11/7/19 09:00


 12/7/19 08:59  11/10/19 08:33


 


 


 Insulin Aspart


  (NovoLOG)  BS control  BEFORE MEALS AND  HS


 SUBQ


   11/7/19 06:30


 12/7/19 06:29  11/10/19 06:28


 


 


 Lisinopril


  (Prinivil)  20 mg  DAILY


 ORAL


   11/11/19 09:00


 12/11/19 08:59 UNV  


 


 


 Multivitamins


 Therapeutic


  (Therapeutic


 Multivitamin)  1 ea  DAILY


 ORAL


   11/7/19 09:00


 12/7/19 08:59  11/10/19 08:34


 


 


 Nifedipine


  (Procardia XL)  60 mg  DAILY


 ORAL


   11/7/19 09:00


 12/7/19 08:59  11/10/19 08:34


 


 


 Pantoprazole


  (Protonix)  40 mg  DAILY@0630


 ORAL


   11/7/19 06:30


 12/7/19 06:29  11/10/19 06:27


 


 


 Quetiapine


 Fumarate


  (SEROquel)  25 mg  Q6HR  PRN


 ORAL


 For Anxiety  11/7/19 03:30


 12/7/19 03:29   


 


 


 Tamsulosin HCl


  (Flomax)  0.4 mg  DAILY


 ORAL


   11/7/19 09:00


 12/7/19 08:59  11/10/19 08:33


 


 


 Vitamin D


  (Vitamin D)  1,000 intlu  DAILY


 ORAL


   11/7/19 09:00


 12/7/19 08:59  11/10/19 08:34


 

















Florin Bowles MD Nov 10, 2019 11:23

## 2019-11-10 NOTE — NUR
NURSE NOTES:

Patient alert and oriented x1 to name only, primarily Burkinan-speaking with no acute s/s of 
distress noted. IV site asymptomatic and patent on R hand 20g, saline lock. Bed in lowest 
position, bed alarm on. Call light and belongings within reach.

## 2019-11-10 NOTE — NUR
NURSE NOTES: Report given to Waldo. Condom cath in place no leakage.  Bed in lowest, locked 
position and alarm on. Patient smiling and talking to RNs with no sign of discomfort.

## 2019-11-11 VITALS — SYSTOLIC BLOOD PRESSURE: 110 MMHG | DIASTOLIC BLOOD PRESSURE: 65 MMHG

## 2019-11-11 VITALS — SYSTOLIC BLOOD PRESSURE: 107 MMHG | DIASTOLIC BLOOD PRESSURE: 68 MMHG

## 2019-11-11 VITALS — SYSTOLIC BLOOD PRESSURE: 105 MMHG | DIASTOLIC BLOOD PRESSURE: 70 MMHG

## 2019-11-11 VITALS — DIASTOLIC BLOOD PRESSURE: 80 MMHG | SYSTOLIC BLOOD PRESSURE: 121 MMHG

## 2019-11-11 RX ADMIN — TAMSULOSIN HYDROCHLORIDE SCH MG: 0.4 CAPSULE ORAL at 10:31

## 2019-11-11 RX ADMIN — ASPIRIN SCH MG: 81 TABLET, DELAYED RELEASE ORAL at 10:31

## 2019-11-11 RX ADMIN — CALCIUM CARBONATE-VITAMIN D TAB 500 MG-200 UNIT SCH TAB: 500-200 TAB at 10:30

## 2019-11-11 RX ADMIN — INSULIN ASPART SCH UNITS: 100 INJECTION, SOLUTION INTRAVENOUS; SUBCUTANEOUS at 05:44

## 2019-11-11 RX ADMIN — Medication SCH EA: at 10:30

## 2019-11-11 RX ADMIN — DULOXETINE HYDROCHLORIDE SCH MG: 30 CAPSULE, DELAYED RELEASE ORAL at 10:31

## 2019-11-11 RX ADMIN — INSULIN ASPART SCH UNITS: 100 INJECTION, SOLUTION INTRAVENOUS; SUBCUTANEOUS at 11:30

## 2019-11-11 RX ADMIN — Medication SCH MG: at 10:32

## 2019-11-11 RX ADMIN — CHOLECALCIFEROL TAB 10 MCG (400 UNIT) SCH INTLU: 10 TAB at 10:30

## 2019-11-11 NOTE — NUR
SWALLOW/SPEECH THERAPY NOTE:



SWALLOW EVAL RECEIVED AND CHART REVIEWED. PATIENT TO BE DISCHARGE PRIOR TO 

EVALUATION. FOLLOW UP AT SNF AND CAN DO OUTPT MOD BARIUM SWALLOW STUDY AS AN OUTPATIENT.  












-------------------------------------------------------------------------------

Addendum: 11/11/19 at 1249 by CATHERINE AGUILA SLP

-------------------------------------------------------------------------------

REFERRED FOR SWALLOW EVALUATION BY DR BRISCOE, SEE FULL REPORT.



DYSPHAGIA RISK FACTORS FOR THIS 69 Y.O. Latvian-SPEAKING MALE:



ACUTE ISSUES AND STATUS:

AMS ENCEPHALOPATHY, MET ACIDOSIS, DEHYDRATION, 



H/O ADVANCED AGE, OBS/DEMENTIA, CVA LSW, TBI, GERD (ON PROTONIX), PEPTIC 

ULCER DZPSYCH (SCHIZOPHRENIA AND MDD, DR JOHNSON PSYCHIATRIST PUT HIM ON 

SEROQUEL), DM2 ON INSULIN.

RELEVANT COMORBIDITIES: HTN



PER POLST OK TO HAVE LONG TERM TUBE FEEDINGS IF NEEDS

AT SNF ON A CC/RCS REG TEXTURE AND THIN LIQUIDS WITH PROSTAT

[facility] % 

[facility].

PER RNNATALIE, PATIENT SLEEPY NOW AND SHE FEELS SWALLOW EVAL SHOULD BE 

COMPLETED LATER, SHE WILL CALL ME. NO OVERT S/S OF ASPIRATION OBSERVED.



PATIENT BEING D/C FROM HOSPITAL SO NOT ABLE TO COMPLETED BEDSIDE SWALLOW EVAL.

F/UP AT SNF FOR SWALLOW EVAL AND OP FOR MOD BARIUM SWALLOW STUDY.

## 2019-11-11 NOTE — NUR
NURSE NOTES:

called and gave report to Jennifer at [facility].   Pt will be p/u around 1430 
from hospital.  Pt assigned room is 4A.



Family friend was notified about DC around 11am, pt wife not available presently at work.  
Family's friend's name Laxmi Gracia.

## 2019-11-11 NOTE — INFECTIOUS DISEASES PROG NOTE
Assessment/Plan


Assessment/Plan


IMPRESSION:  


Lactic acidosis.  


cholelithiasis.  


Diarrhea resolved negative C. Diff.  


Diabetes mellitus,


hypertension, 


BPH, 


dementia, 


elevation in alkaline phosphatase.


Altered mental status





RECOMMENDATIONS:  


 we will observe the patient off antibiotic.





Subjective


ROS Limited/Unobtainable:  Yes


Constitutional:  Denies: fever


Respiratory:  Reports: no symptoms


Gastrointestinal/Abdominal:  Reports: no symptoms


Genitourinary:  Reports: no symptoms


Allergies:  


Coded Allergies:  


     No Known Allergies (Unverified , 9/5/16)





Objective


Vital Signs





Last 24 Hour Vital Signs








  Date Time  Temp Pulse Resp B/P (MAP) Pulse Ox O2 Delivery O2 Flow Rate FiO2


 


11/11/19 10:32    121/80    


 


11/11/19 10:31  73  121/80    


 


11/11/19 08:10 98.7 73 20 121/80 (94) 95   


 


11/11/19 04:00  68      


 


11/11/19 04:00 98.2 72 17 107/68 (81) 97   


 


11/11/19 00:00  81      


 


11/11/19 00:00 98.5 70 18 110/65 (80) 98   


 


11/10/19 21:00      Room Air  


 


11/10/19 20:00  74      


 


11/10/19 20:00 98.1 77 18 105/62 (76) 97   


 


11/10/19 16:00  73      


 


11/10/19 16:00 96.3 76 18 100/75 (83) 98   


 


11/10/19 12:17  74      


 


11/10/19 11:57    95/65 (75)    


 


11/10/19 11:15 98.3 70 20 94/45 (61) 99   








Height (Feet):  5


Height (Inches):  5.00


Weight (Pounds):  180


General Appearance:  no acute distress


HEENT:  mucous membranes moist


Respiratory/Chest:  lungs clear


Cardiovascular:  normal rate


Abdomen:  soft, non tender


Extremities:  no edema


Neurologic/Psychiatric:  alert, responsive





Current Medications








 Medications


  (Trade)  Dose


 Ordered  Sig/Eri


 Route


 PRN Reason  Start Time


 Stop Time Status Last Admin


Dose Admin


 


 Acetaminophen


  (Tylenol)  650 mg  Q6H  PRN


 ORAL


 Mild Pain/Temp > 100.5  11/7/19 06:45


 12/7/19 06:44   


 


 


 Ascorbic Acid


  (Vitamin C)  500 mg  DAILY


 ORAL


   11/7/19 09:00


 12/7/19 08:59  11/11/19 10:32


 


 


 Aspirin


  (Ecotrin)  81 mg  DAILY


 ORAL


   11/7/19 09:00


 12/7/19 08:59  11/11/19 10:31


 


 


 Atorvastatin


 Calcium


  (Lipitor)  80 mg  BEDTIME


 ORAL


   11/7/19 21:00


 12/7/19 20:59  11/9/19 20:25


 


 


 Calcium/Vitamin D


  (OsCal D)  1 tab  BID


 ORAL


   11/7/19 09:00


 12/7/19 08:59  11/11/19 10:30


 


 


 Dextrose


  (Dextrose 50%)  25 ml  Q30M  PRN


 IV


 Hypoglycemia  11/7/19 00:45


 12/7/19 00:44   


 


 


 Dextrose


  (Dextrose 50%)  50 ml  Q30M  PRN


 IV


 Hypoglycemia  11/7/19 00:45


 12/7/19 00:44   


 


 


 Duloxetine HCl


  (Cymbalta)  30 mg  DAILY


 ORAL


   11/7/19 09:00


 12/7/19 08:59  11/11/19 10:31


 


 


 Ferrous Sulfate


  (Feosol)  325 mg  BID


 ORAL


   11/7/19 09:00


 12/7/19 08:59  11/11/19 10:31


 


 


 Insulin Aspart


  (NovoLOG)  BS control  BEFORE MEALS AND  HS


 SUBQ


   11/7/19 06:30


 12/7/19 06:29  11/11/19 05:44


 


 


 Lisinopril


  (Prinivil)  20 mg  DAILY


 ORAL


   11/11/19 09:00


 12/11/19 08:59  11/11/19 10:32


 


 


 Multivitamins


 Therapeutic


  (Therapeutic


 Multivitamin)  1 ea  DAILY


 ORAL


   11/7/19 09:00


 12/7/19 08:59  11/11/19 10:30


 


 


 Nifedipine


  (Procardia XL)  60 mg  DAILY


 ORAL


   11/7/19 09:00


 12/7/19 08:59  11/11/19 10:31


 


 


 Pantoprazole


  (Protonix)  40 mg  DAILY@0630


 ORAL


   11/7/19 06:30


 12/7/19 06:29  11/11/19 05:43


 


 


 Quetiapine


 Fumarate


  (SEROquel)  25 mg  Q6HR  PRN


 ORAL


 For Anxiety  11/7/19 03:30


 12/7/19 03:29  11/10/19 21:31


 


 


 Tamsulosin HCl


  (Flomax)  0.4 mg  DAILY


 ORAL


   11/7/19 09:00


 12/7/19 08:59  11/11/19 10:31


 


 


 Vitamin D


  (Vitamin D)  1,000 intlu  DAILY


 ORAL


   11/7/19 09:00


 12/7/19 08:59  11/11/19 10:30


 

















Florin Bowles MD Nov 11, 2019 11:16

## 2019-11-11 NOTE — NUR
NURSE NOTES:

pt left via gurney in stable condition.  Cardiac monitor off no signs of shortness of breath 
or cardiac signs at time of DC were present.  Pt IV was DC, sit is not bleeding.  Belonging 
were given to ambulance personnel.  Pt unable to sign belonging sheet.  V/S normal at time 
of discharge.   Pt folder with pt history and was given to ambulance personnel.   Pt was 
explained in [place] DC instructions, pt verbalized understanding of instructions.

## 2019-11-11 NOTE — PROGRESS NOTE
DATE:  11/11/2019

SUBJECTIVE:  The patient is doing well and cooperative.  No behavior issues

noted.  The patient is awaiting discharge, calm, and cooperative.



MENTAL STATUS EXAMINATION:  The patient is alert and oriented times self

and place.  Mood is neutral.  Affect is constricted.  Thought process is

concrete.  Thought content, no suicidal or homicidal ideations.

Delusional.  Cognition is impaired.  Insight and judgment is impaired.



ASSESSMENT:  Stable.



PLAN:

1. We will continue current psychotropic medications.

2. Provide the patient with reality orientation and supportive

therapy.









  ______________________________________________

  Humberto Coronel M.D.





DR:  ONEYDA

D:  11/11/2019 14:35

T:  11/11/2019 20:20

JOB#:  [id number]

CC:

## 2019-11-11 NOTE — NUR
***: DISCHARGED PLANNED 



DISCUSSED DISCHARGE WITH MD 

PATIENT IS TO RETURN TO [facility]

T:[phone] FOR NURSE TO NURSE REPORT

ROOM# [phone]

snf 



LEFT A MESSAGE WITH ALISTAIR MEDELLIN (WIFE)

T: [phone]



[facility] CALLED FOR 1430  TIME

## 2019-11-11 NOTE — NUR
NURSE NOTES:

pt in bed sleeping, pt has condom cath.  Pt on cardiac monitor no signs of cardiac or 
respiratory distress at this time.  Call light within reach.  Bed locked and in lowest 
position.  Will continue to monitor pt and follow plan of care.

## 2019-11-12 NOTE — DISCHARGE SUMMARY
Discharge Summary


Discharge Summary


_


DATE OF ADMISSION: 11/6/2019





DATE OF DISCHARGE: 11/11/2019








DISCHARGED BY: Dr Ibarra





REASON FOR ADMISSION: 


69 years old male with past medical history of hypertension, diabetes mellitus, 

cerebrovascular accident  with residual left-sided weakness, anemia, was sent 

from the skilled nursing facility due to altered mental status.


Upon evaluation vital signs revealed mild tachycardia 104 , otherwise   stable.


Laboratory work-up revealed no leukocytosis, hemoglobin 11.7, hematocrit 36.5, 

platelet count 288.


Stable electrolytes and renal parameters.


Glucose 136.


Lactic acid 2.3.


Stable LFT.  


Troponin negative.


Albumin 3.7.


Urinalysis revealed no evidence of urinary tract infection, +1 protein, +1 

ketones.


Chest x-ray revealed no acute cardiopulmonary pathology.


Patient started on  IV fluids and admitted for further management. 


 


CONSULTANTS:


ID specialist Dr. Zimmer


psychiatrist 


 


[facility] COURSE: 


Patient admitted to telemetry floor.


Abdominal ultrasound revealed cholelithiasis, but was negative for dilated bile 

ducts.


Liver demonstrated diffuse increased echogenicity, consistent with diffuse 

hepatocellular disease, most likely fatty.  


Patient  developed diarrhea.  Stool for C. difficile was negative.  Diarrhea 

resolved


Blood cultures were negative.  


Patient remained afebrile.


ID specialist recommended to keep patient off antibiotics.


Psychiatrist followed and optimized  psychiatric medication regimen.  


Reality orientation and supportive therapy provided.  





SNF medication resumed.


Blood pressure was managed with ACE inhibitor and calcium channel blocker.


Antiplatelet therapy with aspirin and statin continued.


GI prophylaxis provided.


Blood sugar was managed with sliding scale of insulin.  


Metformin was hold.  Lactic acidosis was likely due to metformin.  Repeated 

lactic acid 3.2 , hold metformin , continue sliding scale of insulin at the 

facility.  Hemoglobin A1c 7.4.  


Flomax continued.


Supportive care provided.  


Patient clinically stabilized and was ready for transfer back to skilled 

nursing facility for continuation of care





FINAL DIAGNOSES: 


Acute metabolic encephalopathy


Lactic acidosis


Dementia


Cholelithiasis 


Diarrhea - resolved/stool studies negative


Diabetes mellitus


Hypertension


BPH











DISCHARGE MEDICATIONS:


See Medication Reconciliation list.





DISCHARGE INSTRUCTIONS:


Patient was discharged to the skilled nursing facility. 


Follow up with medical doctor at the facility.








I have been assigned to dictate discharge summary for this account. 


I was not involved in the patient's management.











Ruba Rizvi NP Nov 12, 2019 12:23